# Patient Record
Sex: FEMALE | Race: WHITE | NOT HISPANIC OR LATINO | Employment: FULL TIME | URBAN - METROPOLITAN AREA
[De-identification: names, ages, dates, MRNs, and addresses within clinical notes are randomized per-mention and may not be internally consistent; named-entity substitution may affect disease eponyms.]

---

## 2020-02-10 ENCOUNTER — OFFICE VISIT (OUTPATIENT)
Dept: FAMILY MEDICINE CLINIC | Facility: CLINIC | Age: 20
End: 2020-02-10
Payer: COMMERCIAL

## 2020-02-10 ENCOUNTER — OFFICE VISIT (OUTPATIENT)
Dept: OBGYN CLINIC | Facility: CLINIC | Age: 20
End: 2020-02-10
Payer: COMMERCIAL

## 2020-02-10 VITALS
HEIGHT: 64 IN | OXYGEN SATURATION: 98 % | BODY MASS INDEX: 27.18 KG/M2 | SYSTOLIC BLOOD PRESSURE: 120 MMHG | DIASTOLIC BLOOD PRESSURE: 86 MMHG | HEART RATE: 79 BPM | TEMPERATURE: 99.5 F | WEIGHT: 159.2 LBS

## 2020-02-10 VITALS
RESPIRATION RATE: 14 BRPM | WEIGHT: 159.4 LBS | BODY MASS INDEX: 27.21 KG/M2 | DIASTOLIC BLOOD PRESSURE: 60 MMHG | SYSTOLIC BLOOD PRESSURE: 110 MMHG | HEIGHT: 64 IN

## 2020-02-10 DIAGNOSIS — Z11.4 SCREENING FOR HIV (HUMAN IMMUNODEFICIENCY VIRUS): Primary | ICD-10-CM

## 2020-02-10 DIAGNOSIS — Z11.3 SCREEN FOR STD (SEXUALLY TRANSMITTED DISEASE): ICD-10-CM

## 2020-02-10 DIAGNOSIS — Z01.419 ENCOUNTER FOR ANNUAL ROUTINE GYNECOLOGICAL EXAMINATION: Primary | ICD-10-CM

## 2020-02-10 DIAGNOSIS — F41.1 GENERALIZED ANXIETY DISORDER: ICD-10-CM

## 2020-02-10 PROCEDURE — 87591 N.GONORRHOEAE DNA AMP PROB: CPT | Performed by: NURSE PRACTITIONER

## 2020-02-10 PROCEDURE — 1036F TOBACCO NON-USER: CPT | Performed by: FAMILY MEDICINE

## 2020-02-10 PROCEDURE — 87491 CHLMYD TRACH DNA AMP PROBE: CPT | Performed by: NURSE PRACTITIONER

## 2020-02-10 PROCEDURE — 99203 OFFICE O/P NEW LOW 30 MIN: CPT | Performed by: FAMILY MEDICINE

## 2020-02-10 PROCEDURE — 99385 PREV VISIT NEW AGE 18-39: CPT | Performed by: NURSE PRACTITIONER

## 2020-02-10 PROCEDURE — 3008F BODY MASS INDEX DOCD: CPT | Performed by: FAMILY MEDICINE

## 2020-02-10 NOTE — PROGRESS NOTES
BMI Counseling: Body mass index is 27 33 kg/m²  The BMI is above normal  Nutrition recommendations include reducing portion sizes  Exercise recommendations include joining a gym and strength training exercises

## 2020-02-10 NOTE — PROGRESS NOTES
Diagnoses and all orders for this visit:    1  Encounter for annual routine gynecological examination  Safe sex practices and condom use encouraged  Healthy eating and nutrition, daily exercise discussed and SBE reinforced  Call with any issues and all questions and concerns addressed  2  Screen for STD (sexually transmitted disease)  -     Chlamydia/GC amplified DNA by PCR    All questions and concerns answered  Patient to call with any questions  Pleasant 20 y o  premenopausal female here for new patient annual exam  She reports  heavy menses  Reports regular cycles  Denies vaginal, urinary or breast issues, today  Denies pelvic pain  Declines hormonal birth control at this time  Sexually active without any concerns and pt requests STD testing excluding blood work  Denies F/C/N/V  History reviewed  No pertinent past medical history  History reviewed  No pertinent surgical history  History reviewed  No pertinent family history  Social History     Tobacco Use    Smoking status: Never Smoker    Smokeless tobacco: Never Used   Substance Use Topics    Alcohol use: Never     Frequency: Never    Drug use: Never     No current outpatient medications on file  Patient Active Problem List    Diagnosis Date Noted    Generalized anxiety disorder 02/10/2020    Screen for STD (sexually transmitted disease) 02/10/2020    Encounter for annual routine gynecological examination 02/10/2020       No Known Allergies    OB History   No data available     She just completed school to become a [employer]   Works on [employer]     Vitals:    02/10/20 1317   BP: 110/60   BP Location: Right arm   Patient Position: Sitting   Cuff Size: Standard   Resp: 14   Weight: 72 3 kg (159 lb 6 4 oz)   Height: 5' 4" (1 626 m)     Body mass index is 27 36 kg/m²  Review of Systems   Constitutional: Negative for chills, fatigue, fever and unexpected weight change  Respiratory: Negative for shortness of breath  Gastrointestinal: Negative for anal bleeding, blood in stool, constipation and diarrhea  Genitourinary: Negative for difficulty urinating, dysuria and hematuria  OBGyn Exam    Physical Exam   Constitutional: She appears well-developed and well-nourished  No distress  Head: Normocephalic  Neck: Normal range of motion  Neck supple  Pulmonary: Effort normal   Breasts: bilateral without masses, skin changes or nipple discharge  Bilaterally soft and warm to touch  No areas of erythema or pain  Abdominal: Soft  Non-tender  Pelvic exam was performed with patient supine  No labial fusion  There is no rash, tenderness, lesion or injury on the right labia  There is no rash, tenderness, lesion or injury on the left labia  Uterus is not deviated, not enlarged, not fixed and not tender  Cervix exhibits no motion tenderness, no discharge and no friability  Right adnexum displays no mass, no tenderness and no fullness  Left adnexum displays no mass, no tenderness and no fullness  No erythema or tenderness in the vagina  No foreign body in the vagina  No signs of injury around the vagina  Small amount of white vaginal discharge found  STD culture collected  Lymphadenopathy:        Right: No inguinal adenopathy present          Left: No inguinal adenopathy present

## 2020-02-10 NOTE — ASSESSMENT & PLAN NOTE
Patient has mild anxiety generalized in nature at times has difficulty with sleep but otherwise is healthy  She notes that she loses concentration as sometimes or does not here with her climbed her speaking about or remember what they said and has to repeat this at times  She would rather be busy and loses concentration and begins the fissured if she is not busy at work  Overall she has had a mild history of slight attention deficit in high school and took cyber school and did well with that    She appears healthy overall at this time we discussed various options I do not recommend medication for this will talk about attention deficit informational projects and referral for counseling to discuss further

## 2020-02-10 NOTE — PATIENT INSTRUCTIONS
Attention Deficit Hyperactivity Disorder   WHAT YOU SHOULD KNOW:   Attention deficit hyperactivity disorder (ADHD) is a condition that affects behavior  People with ADHD can be overactive and have short attention spans  ADHD interferes with how you function in your day-to-day activities at work, school, or at home  ADHD may also cause you to have problems getting along with other people  The exact cause of ADHD is not known  CARE AGREEMENT:   You have the right to help plan your care  Learn about your health condition and how it may be treated  Discuss treatment options with your caregivers to decide what care you want to receive  You always have the right to refuse treatment  RISKS:   · Some medicines may cause you to have sleeping problems, headache, abdominal pain, or convulsions  Other side effects include loss of appetite, vomiting, irritability, and unusual changes in behavior  · If left untreated, your behavior may get worse and you may also develop other serious problems  These include alcohol or drug use, depression, and problems with your mood, friendships, and relationships  You may have a poor image of yourself  ADHD may affect your behavior at home, work, or school  With ADHD, you may have thoughts of harming yourself or others  WHILE YOU ARE HERE:   Informed consent  is a legal document that explains the tests, treatments, or procedures that you may need  Informed consent means you understand what will be done and can make decisions about what you want  You give your permission when you sign the consent form  You can have someone sign this form for you if you are not able to sign it  You have the right to understand your medical care in words you know  Before you sign the consent form, understand the risks and benefits of what will be done  Make sure all your questions are answered     Psychiatric assessment:  Caregivers will ask if you have a history of psychological trauma, such as physical, sexual, or mental abuse  They will ask if you were given the care that you needed  Caregivers will ask you if you have been a victim of a crime or natural disaster, or if you have a serious injury or disease  They will ask you if you have seen other people being harmed, such as in combat  You will be asked if you drink alcohol or use drugs at present or in the past  Caregivers will ask you if you want to hurt or kill yourself or others  How you answer these questions can help caregivers decide on treatment  To help during treatment, caregivers will ask you about such things as how you feel about it and your hobbies and goals  Caregivers will also ask you about the people in your life who support you  An IV  is a small tube placed in your vein that is used to give you medicine or liquids  Medicines:   · Stimulants: This medicine helps you pay attention, concentrate better, and manage your energy  · Antidepressants: This medicine helps decrease or prevent the symptoms of depression  It can also be used to treat other behavior problems  · Blood pressure medicines: This medicine is usually used to control high blood pressure  It may also be used to help decrease motor tics (uncontrolled movements)  Blood pressure medicine may help you feel calmer, more focused, and less irritable  · Anticonvulsant medicine: This medicine is given to control seizures  Take this medicine exactly as directed  Tests:   · Telemetry  is continuous monitoring of your heart rhythm  Sticky pads placed on your skin connect to an EKG machine that records your heart rhythm  · Blood and urine tests: These tests may be done to find the cause of your ADHD or rule out other health conditions  · Neurologic exam:  This is also called neuro signs, neuro checks, or neuro status  A neurologic exam can show caregivers how well your brain works after an injury or illness   Caregivers will check how your pupils (black dots in the center of each eye) react to light  They may check your memory and how easily you wake up  Your hand grasp and balance may also be tested  Treatment:   · Behavior therapy:  A therapist will help you learn how to control your actions and improve your behavior  This therapy may help you change your behavior by looking at the results of your actions  · Psychotherapy: This is also called talk therapy  You may have one-on-one visit with a therapist or with others in a group setting  © 2014 [address] is for End User's use only and may not be sold, redistributed or otherwise used for commercial purposes  All illustrations and images included in CareNotes® are the copyrighted property of A D A M [employer] Inc  or [employer]  The above information is an  only  It is not intended as medical advice for individual conditions or treatments  Talk to your doctor, nurse or pharmacist before following any medical regimen to see if it is safe and effective for you

## 2020-02-10 NOTE — PATIENT INSTRUCTIONS
Pap Smear   GENERAL INFORMATION:   What is a Pap smear? A Pap smear, or Pap test, is a procedure to check your cervix for abnormal cells  The cervix is the narrow opening at the bottom of your uterus  The cervix meets the top part of the vagina  How do I prepare for a Pap smear? The best time to schedule the test is right after your period stops  Do not have a Pap smear during your monthly period  Do not have intercourse or put anything in your vagina for 24 hours before your test    What will happen during a Pap smear? · You will lie on your back and place your feet on footrests called stirrups  Your caregiver will gently insert a device called a speculum into your vagina  The speculum is used to spread the walls of your vagina so he can see your cervix  He will use a thin brush or cotton swab to collect cells from the inside of your cervix  · Your caregiver will also collect cells from the surface of your cervix with a plastic or wooden tool called a spatula  He may also gently scrape the upper part of your vagina for a sample  The samples are placed in a container with liquid or on a glass slide  They are sent to a lab and examined for abnormal cells  How often do I need a Pap smear? Pap smears are usually done every 1 to 3 years  You may need a Pap smear more often if you have any of the following:  · Positive test result for the human papillomavirus (HPV)    · Cervical intraepithelial neoplasm or cervical cancer    · HIV    · A weak immune system    · Exposure to diethylstilbestrol (JASON) medicine when your mother was pregnant with you  CARE AGREEMENT:   You have the right to help plan your care  Learn about your health condition and how it may be treated  Discuss treatment options with your caregivers to decide what care you want to receive  You always have the right to refuse treatment  The above information is an  only   It is not intended as medical advice for individual conditions or treatments  Talk to your doctor, nurse or pharmacist before following any medical regimen to see if it is safe and effective for you  © 2014 [address] is for End User's use only and may not be sold, redistributed or otherwise used for commercial purposes  All illustrations and images included in CareNotes® are the copyrighted property of A [employer] A M [employer] Inc  or [employer] Braswell

## 2020-02-10 NOTE — PROGRESS NOTES
Assessment/Plan:       Problem List Items Addressed This Visit        Other    Generalized anxiety disorder     Patient has mild anxiety generalized in nature at times has difficulty with sleep but otherwise is healthy  She notes that she loses concentration as sometimes or does not here with her climbed her speaking about or remember what they said and has to repeat this at times  She would rather be busy and loses concentration and begins the fissured if she is not busy at work  Overall she has had a mild history of slight attention deficit in high school and took cyber school and did well with that  She appears healthy overall at this time we discussed various options I do not recommend medication for this will talk about attention deficit informational projects and referral for counseling to discuss further         Relevant Orders    Ambulatory referral to [facility]      Other Visit Diagnoses     Screening for HIV (human immunodeficiency virus)    -  Primary    Relevant Orders    HIV 1/2 AG-AB combo            Subjective:      Patient ID: Linda Moses is a 20 y o  female  Patient presents today for attention issues she has has a new job now as a  she finished in completed her schooling for this but is not busy yet and at times has difficulty with concentration she noticed that back in school and she started cyber school and did better with that at that time    If her issues start affecting her ability to function in her daily living affecting her sleep patterns and her appetite her she gets sick from this physically we can address this further with medication but she came in today to discuss this and approach the treatment plan      The following portions of the patient's history were reviewed and updated as appropriate: allergies, current medications, past family history, past medical history, past social history, past surgical history and problem list     Review of Systems Constitutional: Negative for chills, fatigue and fever  HENT: Negative for congestion, nosebleeds, rhinorrhea, sinus pressure and sore throat  Eyes: Negative for discharge and redness  Respiratory: Negative for cough and shortness of breath  Cardiovascular: Negative for chest pain, palpitations and leg swelling  Gastrointestinal: Negative for abdominal pain, blood in stool and nausea  Endocrine: Negative for cold intolerance, heat intolerance and polyuria  Genitourinary: Negative for dysuria and frequency  Musculoskeletal: Negative for arthralgias, back pain and myalgias  Skin: Negative for rash  Neurological: Negative for dizziness, weakness and headaches  Hematological: Negative for adenopathy  Psychiatric/Behavioral: Negative for behavioral problems and sleep disturbance  The patient is nervous/anxious  Objective:      /86   Pulse 79   Temp 99 5 °F (37 5 °C)   Ht 5' 4" (1 626 m)   Wt 72 2 kg (159 lb 3 2 oz)   SpO2 98%   BMI 27 33 kg/m²        Physical Exam   Constitutional: She is oriented to person, place, and time  She appears well-developed and well-nourished  No distress  HENT:   Head: Normocephalic and atraumatic  Right Ear: External ear normal    Left Ear: External ear normal    Nose: Nose normal    Mouth/Throat: Oropharynx is clear and moist  No oropharyngeal exudate  Eyes: Pupils are equal, round, and reactive to light  Conjunctivae and EOM are normal  Right eye exhibits no discharge  Left eye exhibits no discharge  No scleral icterus  Neck: Normal range of motion  No JVD present  No thyromegaly present  Cardiovascular: Normal rate, regular rhythm and normal heart sounds  No murmur heard  Pulmonary/Chest: Effort normal  She has no wheezes  She has no rales  She exhibits no tenderness  Abdominal: Soft  Bowel sounds are normal  She exhibits no distension and no mass  There is no tenderness  Musculoskeletal: Normal range of motion   She exhibits no edema, tenderness or deformity  Lymphadenopathy:     She has no cervical adenopathy  Neurological: She is alert and oriented to person, place, and time  She has normal reflexes  She displays normal reflexes  No cranial nerve deficit  Coordination normal    Skin: Skin is warm and dry  No rash noted  Psychiatric: She has a normal mood and affect  Her behavior is normal  Judgment and thought content normal    Nursing note and vitals reviewed  Data:    Laboratory Results: I have personally reviewed the pertinent laboratory results/reports   Radiology/Other Diagnostic Testing Results: I have personally reviewed pertinent reports         No results found for: WBC, HGB, HCT, MCV, PLT  No results found for: NA, K, CL, CO2, ANIONGAP, BUN, CREATININE, GLUCOSE, GLUF, CALCIUM, CORRECTEDCA, AST, ALT, ALKPHOS, PROT, BILITOT, EGFR  No results found for: CHOLESTEROL  No results found for: HDL  No results found for: LDLCALC  No results found for: TRIG  No results found for: CHOLHDL  No results found for: [id number], TSH  No results found for: HGBA1C  No results found for: PSA    Land O'Naima, DO

## 2020-02-12 LAB
C TRACH DNA SPEC QL NAA+PROBE: NEGATIVE
N GONORRHOEA DNA SPEC QL NAA+PROBE: NEGATIVE

## 2020-07-16 ENCOUNTER — OFFICE VISIT (OUTPATIENT)
Dept: FAMILY MEDICINE CLINIC | Facility: CLINIC | Age: 20
End: 2020-07-16
Payer: COMMERCIAL

## 2020-07-16 VITALS
WEIGHT: 162.8 LBS | HEART RATE: 78 BPM | BODY MASS INDEX: 27.79 KG/M2 | SYSTOLIC BLOOD PRESSURE: 120 MMHG | HEIGHT: 64 IN | TEMPERATURE: 97.8 F | OXYGEN SATURATION: 99 % | DIASTOLIC BLOOD PRESSURE: 78 MMHG

## 2020-07-16 DIAGNOSIS — B07.8 COMMON WART: Primary | ICD-10-CM

## 2020-07-16 PROCEDURE — 1036F TOBACCO NON-USER: CPT | Performed by: FAMILY MEDICINE

## 2020-07-16 PROCEDURE — 3008F BODY MASS INDEX DOCD: CPT | Performed by: FAMILY MEDICINE

## 2020-07-16 PROCEDURE — 17110 DESTRUCTION B9 LES UP TO 14: CPT | Performed by: FAMILY MEDICINE

## 2020-07-16 PROCEDURE — 99213 OFFICE O/P EST LOW 20 MIN: CPT | Performed by: FAMILY MEDICINE

## 2020-07-16 NOTE — PATIENT INSTRUCTIONS
Common Wart   WHAT YOU NEED TO KNOW:   A common wart is a thick, rough, skin growth caused by human papillomavirus virus (HPV)  HPV is a germ that spreads by skin-to-skin contact or contact with contaminated surfaces  Common warts are benign (not cancer)  DISCHARGE INSTRUCTIONS:   Contact your healthcare provider or dermatologist if:   · Your wart returns or does not go away after treatment  · Your wart grows larger, or begins to spread or cluster  · You have a wart on your face, genitals, or rectum  · Your wart bleeds, becomes painful, or drains pus  · You have questions about your condition or care  Medicines:   · Salicylic acid  helps dry and remove the wart  It is available without a prescription  Ask your healthcare provider where you can purchase it  Before you apply salicylic acid, soak the wart in warm water for 20 minutes  Keep your wart damp  Apply a small amount of salicylic acid directly to your wart  Do not apply salicylic acid to healthy skin  Cover the wart as directed  It is best to do this at bedtime  When you wake, use a pumice stone (a rough stone) or nail file to gently remove dead skin  Repeat as directed  · Take your medicine as directed  Contact your healthcare provider if you think your medicine is not helping or if you have side effects  Tell him or her if you are allergic to any medicine  Keep a list of the medicines, vitamins, and herbs you take  Include the amounts, and when and why you take them  Bring the list or the pill bottles to follow-up visits  Carry your medicine list with you in case of an emergency  Apply duct tape to your wart as directed: Your healthcare provider may tell you to apply duct tape to your wart  Duct tape helps dry and remove the wart  You may be directed to leave the duct tape on for 6 days  On day 7, take the tape off and soak the wart in warm water for 5 minutes  Gently scrape the wart with a pumice stone or nail file   Then apply a new piece of duct tape and follow the same steps until the wart is gone  Follow up with your healthcare provider as directed:  Write down your questions so you remember to ask them during your visits  © 2017 [address] Information is for End User's use only and may not be sold, redistributed or otherwise used for commercial purposes  All illustrations and images included in CareNotes® are the copyrighted property of [employer]  or [employer]  The above information is an  only  It is not intended as medical advice for individual conditions or treatments  Talk to your doctor, nurse or pharmacist before following any medical regimen to see if it is safe and effective for you

## 2020-07-16 NOTE — PROGRESS NOTES
Assessment/Plan:       Problem List Items Addressed This Visit        Other    Common wart - Primary     Presents for a 0 75 cm wart on the index finger of her right hand at the lateral aspect of the nail bed  Cryosurgery done at this time -re-evaluate in 6 weeks                 Subjective:      Patient ID: Lei Fothergill is a 20 y o  female  Patient presents for a wart on her index finger has been present for about 2 months did not come off from home treatment and needs removal today      The following portions of the patient's history were reviewed and updated as appropriate: allergies, current medications, past family history, past medical history, past social history, past surgical history and problem list     Review of Systems   Constitutional: Negative for chills, fatigue and fever  HENT: Negative for congestion, nosebleeds, rhinorrhea, sinus pressure and sore throat  Eyes: Negative for discharge and redness  Respiratory: Negative for cough and shortness of breath  Cardiovascular: Negative for chest pain, palpitations and leg swelling  Gastrointestinal: Negative for abdominal pain, blood in stool and nausea  Endocrine: Negative for cold intolerance, heat intolerance and polyuria  Genitourinary: Negative for dysuria and frequency  Musculoskeletal: Negative for arthralgias, back pain and myalgias  Skin: Negative for rash  Neurological: Negative for dizziness, weakness and headaches  Hematological: Negative for adenopathy  Psychiatric/Behavioral: Negative for behavioral problems and sleep disturbance  The patient is not nervous/anxious  Objective:      /78 (BP Location: Left arm, Patient Position: Sitting)   Pulse 78   Temp 97 8 °F (36 6 °C)   Ht 5' 4" (1 626 m)   Wt 73 8 kg (162 lb 12 8 oz)   SpO2 99%   BMI 27 94 kg/m²          Physical Exam   Constitutional: She is oriented to person, place, and time  She appears well-developed and well-nourished  No distress  HENT:   Head: Normocephalic and atraumatic  Right Ear: External ear normal    Left Ear: External ear normal    Nose: Nose normal    Mouth/Throat: Oropharynx is clear and moist  No oropharyngeal exudate  Eyes: Pupils are equal, round, and reactive to light  Conjunctivae and EOM are normal  Right eye exhibits no discharge  Left eye exhibits no discharge  No scleral icterus  Neck: Normal range of motion  No JVD present  No thyromegaly present  Cardiovascular: Normal rate, regular rhythm and normal heart sounds  No murmur heard  Pulmonary/Chest: Effort normal  She has no wheezes  She has no rales  She exhibits no tenderness  Abdominal: Soft  Bowel sounds are normal  She exhibits no distension and no mass  There is no tenderness  Musculoskeletal: Normal range of motion  She exhibits no edema, tenderness or deformity  Lymphadenopathy:     She has no cervical adenopathy  Neurological: She is alert and oriented to person, place, and time  She has normal reflexes  She displays normal reflexes  No cranial nerve deficit  Coordination normal    Skin: Skin is warm and dry  No rash noted  Psychiatric: She has a normal mood and affect  Her behavior is normal  Judgment and thought content normal    Nursing note and vitals reviewed      Lesion Destruction  Date/Time: 7/16/2020 11:28 AM  Performed by: Jean Shepherd DO  Authorized by: Jean Shepherd DO     Procedure Details - Lesion Destruction:     Number of Lesions:  1  Lesion 1:     Body area:  Upper extremity    Upper extremity location:  R index finger    Malignancy: benign lesion      Destruction method: cryotherapy          Data:    Laboratory Results:   Radiology/Other Diagnostic Testing Results:    No results found for: WBC, HGB, HCT, MCV, PLT  No results found for: NA, K, CL, CO2, ANIONGAP, BUN, CREATININE, GLUCOSE, GLUF, CALCIUM, CORRECTEDCA, AST, ALT, ALKPHOS, PROT, BILITOT, EGFR  No results found for: CHOLESTEROL  No results found for: HDL  No results found for: LDLCALC  No results found for: TRIG  No results found for: CHOLHDL  No results found for: [id number], TSH  No results found for: HGBA1C  No results found for: PSA    Consuelo Cornelius, DO

## 2020-07-16 NOTE — ASSESSMENT & PLAN NOTE
Presents for a 0 75 cm wart on the index finger of her right hand at the lateral aspect of the nail bed    Cryosurgery done at this time -re-evaluate in 6 weeks

## 2021-12-27 ENCOUNTER — CLINICAL SUPPORT (OUTPATIENT)
Dept: FAMILY MEDICINE CLINIC | Facility: CLINIC | Age: 21
End: 2021-12-27

## 2021-12-27 DIAGNOSIS — R05.9 COUGH: Primary | ICD-10-CM

## 2021-12-27 PROCEDURE — 87636 SARSCOV2 & INF A&B AMP PRB: CPT | Performed by: FAMILY MEDICINE

## 2021-12-30 LAB
FLUAV RNA RESP QL NAA+PROBE: NEGATIVE
FLUBV RNA RESP QL NAA+PROBE: NEGATIVE
SARS-COV-2 RNA RESP QL NAA+PROBE: POSITIVE

## 2023-06-23 ENCOUNTER — OFFICE VISIT (OUTPATIENT)
Dept: FAMILY MEDICINE CLINIC | Facility: CLINIC | Age: 23
End: 2023-06-23
Payer: COMMERCIAL

## 2023-06-23 VITALS
TEMPERATURE: 97.5 F | HEIGHT: 64 IN | HEART RATE: 72 BPM | BODY MASS INDEX: 29.53 KG/M2 | OXYGEN SATURATION: 96 % | WEIGHT: 173 LBS | RESPIRATION RATE: 18 BRPM | DIASTOLIC BLOOD PRESSURE: 72 MMHG | SYSTOLIC BLOOD PRESSURE: 110 MMHG

## 2023-06-23 DIAGNOSIS — J34.89 DRY NARES: Primary | ICD-10-CM

## 2023-06-23 PROCEDURE — 99213 OFFICE O/P EST LOW 20 MIN: CPT | Performed by: FAMILY MEDICINE

## 2023-06-23 RX ORDER — AZITHROMYCIN 250 MG/1
TABLET, FILM COATED ORAL
Qty: 6 TABLET | Refills: 0 | Status: SHIPPED | OUTPATIENT
Start: 2023-06-23 | End: 2023-06-28

## 2023-06-23 NOTE — ASSESSMENT & PLAN NOTE
inflammed nose ring area right nares-no drainage now    Patient will apply triple antibiotic ointment to this area and I will provide her with a short course of antibiotics if it does not improve or if it worsens she will contact our office next week

## 2023-06-23 NOTE — PROGRESS NOTES
"Assessment/Plan:       Problem List Items Addressed This Visit        Other    Dry nares - Primary     inflammed nose ring area right nares-no drainage now  Patient will apply triple antibiotic ointment to this area and I will provide her with a short course of antibiotics if it does not improve or if it worsens she will contact our office next week         Relevant Medications    azithromycin (ZITHROMAX) 250 mg tablet         Subjective:      Patient ID: David Gotti is a 20 y o  female  Inflamed nose ring insertion area right nares started to improve after she was at the beach and swimming in the ocean      The following portions of the patient's history were reviewed and updated as appropriate: allergies, current medications, past family history, past medical history, past social history, past surgical history and problem list     Review of Systems   Constitutional: Negative for chills, fatigue and fever  HENT: Negative for congestion, nosebleeds, rhinorrhea, sinus pressure and sore throat  Eyes: Negative for discharge and redness  Respiratory: Negative for cough and shortness of breath  Cardiovascular: Negative for chest pain, palpitations and leg swelling  Gastrointestinal: Negative for abdominal pain, blood in stool and nausea  Endocrine: Negative for cold intolerance, heat intolerance and polyuria  Genitourinary: Negative for dysuria and frequency  Musculoskeletal: Negative for arthralgias, back pain and myalgias  Skin: Negative for rash  Neurological: Negative for dizziness, weakness and headaches  Hematological: Negative for adenopathy  Psychiatric/Behavioral: Negative for behavioral problems and sleep disturbance  The patient is not nervous/anxious            Objective:      /72 (BP Location: Left arm, Patient Position: Sitting, Cuff Size: Standard)   Pulse 72   Temp 97 5 °F (36 4 °C) (Tympanic)   Resp 18   Ht 5' 4\" (1 626 m)   Wt 78 5 kg (173 lb)   SpO2 96%   BMI " "29 70 kg/m²        Physical Exam  Vitals and nursing note reviewed  Constitutional:       General: She is not in acute distress  Appearance: She is well-developed  HENT:      Head: Normocephalic and atraumatic  Right Ear: External ear normal       Left Ear: External ear normal       Nose: Nose normal       Mouth/Throat:      Pharynx: No oropharyngeal exudate  Eyes:      General: No scleral icterus  Right eye: No discharge  Left eye: No discharge  Conjunctiva/sclera: Conjunctivae normal       Pupils: Pupils are equal, round, and reactive to light  Neck:      Thyroid: No thyromegaly  Vascular: No JVD  Cardiovascular:      Rate and Rhythm: Normal rate and regular rhythm  Heart sounds: Normal heart sounds  No murmur heard  Pulmonary:      Effort: Pulmonary effort is normal       Breath sounds: No wheezing or rales  Chest:      Chest wall: No tenderness  Abdominal:      General: Bowel sounds are normal  There is no distension  Palpations: Abdomen is soft  There is no mass  Tenderness: There is no abdominal tenderness  Musculoskeletal:         General: No tenderness or deformity  Normal range of motion  Cervical back: Normal range of motion  Lymphadenopathy:      Cervical: No cervical adenopathy  Skin:     General: Skin is warm and dry  Findings: No rash  Comments: Mild inflammation around nose ring right nares   Neurological:      Mental Status: She is alert and oriented to person, place, and time  Cranial Nerves: No cranial nerve deficit  Coordination: Coordination normal       Deep Tendon Reflexes: Reflexes are normal and symmetric  Reflexes normal    Psychiatric:         Behavior: Behavior normal          Thought Content:  Thought content normal          Judgment: Judgment normal           Data:    Laboratory Results:   Radiology/Other Diagnostic Testing Results:      No results found for: \"WBC\", \"HGB\", \"HCT\", \"MCV\", " "\"PLT\"  No results found for: \"NA\", \"K\", \"CL\", \"CO2\", \"ANIONGAP\", \"BUN\", \"CREATININE\", \"GLUCOSE\", \"GLUF\", \"CALCIUM\", \"CORRECTEDCA\", \"AST\", \"ALT\", \"ALKPHOS\", \"PROT\", \"BILITOT\", \"EGFR\"  No results found for: \"CHOLESTEROL\"  No results found for: \"HDL\"  No results found for: \"LDLCALC\"  No results found for: \"TRIG\"  No results found for: \"CHOLHDL\"  No results found for: \"[id number]\", \"TSH\"  No results found for: \"HGBA1C\"  No results found for: \"PSA\"    [place], DO      "

## 2023-07-03 ENCOUNTER — OFFICE VISIT (OUTPATIENT)
Dept: FAMILY MEDICINE CLINIC | Facility: CLINIC | Age: 23
End: 2023-07-03
Payer: COMMERCIAL

## 2023-07-03 VITALS
HEIGHT: 64 IN | WEIGHT: 176.8 LBS | BODY MASS INDEX: 30.19 KG/M2 | OXYGEN SATURATION: 99 % | SYSTOLIC BLOOD PRESSURE: 122 MMHG | TEMPERATURE: 98.1 F | HEART RATE: 63 BPM | DIASTOLIC BLOOD PRESSURE: 80 MMHG | RESPIRATION RATE: 18 BRPM

## 2023-07-03 DIAGNOSIS — T14.8XXA BLOOD BLISTER: ICD-10-CM

## 2023-07-03 DIAGNOSIS — Z78.9 BODY PIERCING: Primary | ICD-10-CM

## 2023-07-03 PROCEDURE — 99213 OFFICE O/P EST LOW 20 MIN: CPT | Performed by: NURSE PRACTITIONER

## 2023-07-03 RX ORDER — CLINDAMYCIN HYDROCHLORIDE 300 MG/1
300 CAPSULE ORAL 4 TIMES DAILY
Qty: 28 CAPSULE | Refills: 0 | Status: SHIPPED | OUTPATIENT
Start: 2023-07-03 | End: 2023-07-10

## 2023-07-03 NOTE — ASSESSMENT & PLAN NOTE
Piercing to right nares. Erythema surrounding area with blood blister intact. We will start on clindamycin and she will use triple antibiotic ointment with Q-tip. Mild pressure applied to blood blister with drainage.   Follow-up on Friday

## 2023-07-03 NOTE — PROGRESS NOTES
OFFICE VISIT  Jacquie Alatorre 20 y.o. female MRN: [id number]          Assessment / Plan:  Problem List Items Addressed This Visit        Other    Body piercing - Primary     Piercing to right nares. Erythema surrounding area with blood blister intact. We will start on clindamycin and she will use triple antibiotic ointment with Q-tip. Mild pressure applied to blood blister with drainage. Follow-up on Friday         Relevant Medications    clindamycin (CLEOCIN) 300 MG capsule   Other Visit Diagnoses     Blood blister                Reason For Visit / Chief Complaint  Chief Complaint   Patient presents with   • nose ring     Infection , black saw Dr last week and got a Z- pack         HPI:  Jacquie Alatorre is a 20 y.o. female who presents today for follow-up. Patient was seen last week for nose piercing questionable infection. She was started on azithromycin. She reports area remains red with black bump    Historical Information   History reviewed. No pertinent past medical history. History reviewed. No pertinent surgical history. Social History   Social History     Substance and Sexual Activity   Alcohol Use Never     Social History     Substance and Sexual Activity   Drug Use Never     Social History     Tobacco Use   Smoking Status Never   Smokeless Tobacco Never     History reviewed. No pertinent family history. Meds/Allergies   No Known Allergies    Meds:    Current Outpatient Medications:   •  clindamycin (CLEOCIN) 300 MG capsule, Take 1 capsule (300 mg total) by mouth 4 (four) times a day for 7 days, Disp: 28 capsule, Rfl: 0      REVIEW OF SYSTEMS  Review of Systems   Constitutional: Negative for activity change, chills, fatigue and fever. HENT: Negative for congestion, ear discharge, ear pain, sinus pressure, sinus pain, sore throat, tinnitus and trouble swallowing. Eyes: Negative for photophobia, pain, discharge, itching and visual disturbance.    Respiratory: Negative for cough, chest tightness, shortness of breath and wheezing. Cardiovascular: Negative for chest pain and leg swelling. Gastrointestinal: Negative for abdominal distention, abdominal pain, constipation, diarrhea, nausea and vomiting. Endocrine: Negative for polydipsia, polyphagia and polyuria. Genitourinary: Negative for dysuria and frequency. Musculoskeletal: Negative for arthralgias, myalgias, neck pain and neck stiffness. Skin: Positive for color change. Neurological: Negative for dizziness, syncope, weakness, numbness and headaches. Hematological: Does not bruise/bleed easily. Psychiatric/Behavioral: Negative for behavioral problems, confusion, self-injury, sleep disturbance and suicidal ideas. The patient is not nervous/anxious. Current Vitals:   Blood Pressure: 122/80 (07/03/23 1405)  Pulse: 63 (07/03/23 1405)  Temperature: 98.1 °F (36.7 °C) (07/03/23 1405)  Temp Source: Tympanic (07/03/23 1405)  Respirations: 18 (07/03/23 1405)  Height: 5' 4" (162.6 cm) (07/03/23 1405)  Weight - Scale: 80.2 kg (176 lb 12.8 oz) (07/03/23 1405)  SpO2: 99 % (07/03/23 1405)  [unfilled]    PHYSICAL EXAMS:  Physical Exam  Vitals and nursing note reviewed. Constitutional:       Appearance: Normal appearance. She is well-developed. HENT:      Head: Normocephalic and atraumatic. Right Ear: Tympanic membrane normal.      Left Ear: Tympanic membrane normal.   Cardiovascular:      Rate and Rhythm: Normal rate and regular rhythm. Pulses: Normal pulses. Heart sounds: Normal heart sounds. Pulmonary:      Effort: Pulmonary effort is normal.      Breath sounds: Normal breath sounds. No wheezing or rhonchi. Abdominal:      General: There is no distension. Tenderness: There is no abdominal tenderness. Musculoskeletal:         General: No swelling, tenderness, deformity or signs of injury. Right lower leg: No edema. Left lower leg: No edema. Skin:     Findings: Erythema present.  No bruising, lesion or rash. Comments: Blood blister to nose   Neurological:      General: No focal deficit present. Mental Status: She is alert and oriented to person, place, and time. Psychiatric:         Mood and Affect: Mood normal.         Behavior: Behavior normal.         Thought Content: Thought content normal.         Judgment: Judgment normal.             Lab, imaging and other studies: I have personally reviewed pertinent reports. Mira Mckenzie

## 2023-07-07 ENCOUNTER — OFFICE VISIT (OUTPATIENT)
Dept: FAMILY MEDICINE CLINIC | Facility: CLINIC | Age: 23
End: 2023-07-07
Payer: COMMERCIAL

## 2023-07-07 VITALS
OXYGEN SATURATION: 98 % | TEMPERATURE: 98.6 F | SYSTOLIC BLOOD PRESSURE: 120 MMHG | HEART RATE: 74 BPM | DIASTOLIC BLOOD PRESSURE: 80 MMHG | BODY MASS INDEX: 30.15 KG/M2 | RESPIRATION RATE: 18 BRPM | HEIGHT: 64 IN | WEIGHT: 176.6 LBS

## 2023-07-07 DIAGNOSIS — Z78.9 BODY PIERCING: Primary | ICD-10-CM

## 2023-07-07 DIAGNOSIS — L08.9 INFECTED PIERCED NOSE: ICD-10-CM

## 2023-07-07 DIAGNOSIS — S01.23XA INFECTED PIERCED NOSE: ICD-10-CM

## 2023-07-07 PROCEDURE — 99213 OFFICE O/P EST LOW 20 MIN: CPT | Performed by: NURSE PRACTITIONER

## 2023-07-07 NOTE — ASSESSMENT & PLAN NOTE
Here for recheck today, no worsening redness or swelling to right nares, nose ring. We will continue with clindamycin. Light lavage for drainage completed.   Will return Monday if needing I&D with primary PCP

## 2023-07-07 NOTE — PROGRESS NOTES
OFFICE VISIT  Andrade Sommers 20 y.o. female MRN: [id number]          Assessment / Plan:  Problem List Items Addressed This Visit        Other    Body piercing - Primary    Infected pierced nose     Here for recheck today, no worsening redness or swelling to right nares, nose ring. We will continue with clindamycin. Light lavage for drainage completed. Will return Monday if needing I&D with primary PCP              Reason For Visit / Chief Complaint  Chief Complaint   Patient presents with   • Nose Problem     Nose ring infected. HPI:  Andrade Sommers is a 20 y.o. female who presents today for recheck. Historical Information   No past medical history on file. History reviewed. No pertinent surgical history. Social History   Social History     Substance and Sexual Activity   Alcohol Use Never     Social History     Substance and Sexual Activity   Drug Use Never     Social History     Tobacco Use   Smoking Status Never   Smokeless Tobacco Never     History reviewed. No pertinent family history. Meds/Allergies   No Known Allergies    Meds:    Current Outpatient Medications:   •  clindamycin (CLEOCIN) 300 MG capsule, Take 1 capsule (300 mg total) by mouth 4 (four) times a day for 7 days, Disp: 28 capsule, Rfl: 0      REVIEW OF SYSTEMS  Review of Systems   Constitutional: Negative for activity change, chills, fatigue and fever. HENT: Negative for congestion, ear discharge, ear pain, sinus pressure, sinus pain, sore throat, tinnitus and trouble swallowing. Eyes: Negative for photophobia, pain, discharge, itching and visual disturbance. Respiratory: Negative for cough, chest tightness, shortness of breath and wheezing. Cardiovascular: Negative for chest pain and leg swelling. Gastrointestinal: Negative for abdominal distention, abdominal pain, constipation, diarrhea, nausea and vomiting. Endocrine: Negative for polydipsia, polyphagia and polyuria.    Genitourinary: Negative for dysuria and frequency. Musculoskeletal: Negative for arthralgias, myalgias, neck pain and neck stiffness. Skin: Positive for color change. Neurological: Negative for dizziness, syncope, weakness, numbness and headaches. Hematological: Does not bruise/bleed easily. Psychiatric/Behavioral: Negative for behavioral problems, confusion, self-injury, sleep disturbance and suicidal ideas. The patient is not nervous/anxious. Current Vitals:   Blood Pressure: 120/80 (07/07/23 0937)  Pulse: 74 (07/07/23 0937)  Temperature: 98.6 °F (37 °C) (07/07/23 0937)  Temp Source: Tympanic (07/07/23 0268)  Respirations: 18 (07/07/23 0937)  Height: 5' 4" (162.6 cm) (07/07/23 7098)  Weight - Scale: 80.1 kg (176 lb 9.6 oz) (07/07/23 0937)  SpO2: 98 % (07/07/23 0937)  [unfilled]    PHYSICAL EXAMS:  Physical Exam  Vitals and nursing note reviewed. Constitutional:       Appearance: Normal appearance. She is well-developed. HENT:      Head: Normocephalic and atraumatic. Nose:        Comments: Small papule above nose ring insertion  Cardiovascular:      Rate and Rhythm: Normal rate and regular rhythm. Pulses: Normal pulses. Heart sounds: Normal heart sounds. Pulmonary:      Effort: Pulmonary effort is normal.      Breath sounds: Normal breath sounds. No wheezing or rhonchi. Abdominal:      General: There is no distension. Tenderness: There is no abdominal tenderness. Musculoskeletal:         General: No swelling, tenderness, deformity or signs of injury. Cervical back: Normal range of motion. Right lower leg: No edema. Left lower leg: No edema. Skin:     General: Skin is warm. Findings: No bruising, erythema, lesion or rash. Neurological:      General: No focal deficit present. Mental Status: She is alert and oriented to person, place, and time. Psychiatric:         Mood and Affect: Mood normal.         Behavior: Behavior normal.         Thought Content:  Thought content normal.         Judgment: Judgment normal.             Lab, imaging and other studies: I have personally reviewed pertinent reports. Jelena Ignacio

## 2023-10-26 ENCOUNTER — VBI (OUTPATIENT)
Dept: ADMINISTRATIVE | Facility: OTHER | Age: 23
End: 2023-10-26

## 2024-02-21 PROBLEM — Z11.3 SCREEN FOR STD (SEXUALLY TRANSMITTED DISEASE): Status: RESOLVED | Noted: 2020-02-10 | Resolved: 2024-02-21

## 2024-02-21 PROBLEM — Z01.419 ENCOUNTER FOR ANNUAL ROUTINE GYNECOLOGICAL EXAMINATION: Status: RESOLVED | Noted: 2020-02-10 | Resolved: 2024-02-21

## 2024-05-29 ENCOUNTER — ULTRASOUND (OUTPATIENT)
Dept: OBGYN CLINIC | Facility: CLINIC | Age: 24
End: 2024-05-29
Payer: COMMERCIAL

## 2024-05-29 VITALS
DIASTOLIC BLOOD PRESSURE: 68 MMHG | WEIGHT: 180 LBS | HEIGHT: 64 IN | BODY MASS INDEX: 30.73 KG/M2 | SYSTOLIC BLOOD PRESSURE: 104 MMHG

## 2024-05-29 DIAGNOSIS — Z34.90 EARLY STAGE OF PREGNANCY: Primary | ICD-10-CM

## 2024-05-29 DIAGNOSIS — N91.2 AMENORRHEA: ICD-10-CM

## 2024-05-29 DIAGNOSIS — O21.9 NAUSEA AND VOMITING IN PREGNANCY: ICD-10-CM

## 2024-05-29 PROCEDURE — 99204 OFFICE O/P NEW MOD 45 MIN: CPT | Performed by: STUDENT IN AN ORGANIZED HEALTH CARE EDUCATION/TRAINING PROGRAM

## 2024-05-29 PROCEDURE — 76817 TRANSVAGINAL US OBSTETRIC: CPT | Performed by: STUDENT IN AN ORGANIZED HEALTH CARE EDUCATION/TRAINING PROGRAM

## 2024-05-29 RX ORDER — ONDANSETRON 4 MG/1
4 TABLET, ORALLY DISINTEGRATING ORAL EVERY 6 HOURS PRN
Qty: 30 TABLET | Refills: 0 | Status: SHIPPED | OUTPATIENT
Start: 2024-05-29

## 2024-05-29 NOTE — PROGRESS NOTES
Pt is here for early ultrasound   No concerns at this time    G1  LMP 3/29  Regular Cycles   8 weeks 5 days   KEN 1/3/25  [place] Pregnancy   No Vaginal Bleeding   Nausea and Vomiting  Blood Type N/A       Ultrasound Probe Disinfection    A transvaginal ultrasound was performed.   Prior to use, disinfection was performed with High Level Disinfection Process ([employer])  Probe serial number [id number]:  [id number] was used    HARJEET TORRES  05/29/24  1:49 PM

## 2024-05-29 NOTE — PROGRESS NOTES
"Ambulatory Visit  Name: Elda Flores      : 2000      MRN: [id number]  Encounter Provider: Charlee Nick MD  Encounter Date: 2024   Encounter department: [facility] [place]    Assessment & Plan   1. Early stage of pregnancy  2. Amenorrhea  -     AMB US OB < 14 weeks single or first gestation level 1  3. Nausea and vomiting in pregnancy  -     ondansetron (ZOFRAN-ODT) 4 mg disintegrating tablet; Take 1 tablet (4 mg total) by mouth every 6 (six) hours as needed for nausea or vomiting    Early pregnancy at 8 weeks 3 days with a calculated KEN of 2025 based on early ultrasound    - Genetic screening options reviewed. She is interested in NIPT, so MFM referral placed  - Prenatal care reviewed  - Pregnancy precautions reviewed    RTO for OB interview and PN-1 visit      History of Present Illness     Elda Flores is a 23 y.o. female who presents today for verification of pregnancy. History also given by her partner.  female, Patient's last menstrual period was 2024 (unsure of exact date). Menses are regular. This pregnancy was unplanned, but is welcomed.     Taking a prenatal vitamin intermittently because she is nauseated and having vomiting.     Review of Systems no vaginal bleeding      Objective     /68 (BP Location: Left arm, Patient Position: Sitting, Cuff Size: Large)   Ht 5' 4\" (1.626 m)   Wt 81.6 kg (180 lb)   LMP 2024 (Exact Date)   BMI 30.90 kg/m²     Physical Exam  Vitals and nursing note reviewed.   Constitutional:       General: She is not in acute distress.     Appearance: She is well-developed.   HENT:      Head: Normocephalic and atraumatic.   Cardiovascular:      Rate and Rhythm: Normal rate.   Pulmonary:      Effort: Pulmonary effort is normal. No respiratory distress.   Genitourinary:     General: Normal vulva.      Exam position: Lithotomy position.      Vagina: No bleeding.   Skin:     General: Skin is warm " and dry.      Capillary Refill: Capillary refill takes less than 2 seconds.   Neurological:      Mental Status: She is alert.   Psychiatric:         Mood and Affect: Mood normal.       Administrative Statements

## 2024-05-30 ENCOUNTER — TELEPHONE (OUTPATIENT)
Age: 24
End: 2024-05-30

## 2024-05-30 ENCOUNTER — TELEPHONE (OUTPATIENT)
Dept: PERINATAL CARE | Facility: OTHER | Age: 24
End: 2024-05-30

## 2024-05-30 NOTE — TELEPHONE ENCOUNTER
Called patient to schedule MFM appointment, based on referral issued to Maternal Fetal Medicine by OB office.    Left voicemail requesting patient to call back and schedule appointment, with office number for return call [phone].

## 2024-06-11 NOTE — PATIENT INSTRUCTIONS
Congratulations!! Please review our Pregnancy Essential Guide and [facility] L&D Virtual tour from our networks website.     [facility] Pregnancy Essentials Guide  [facility] ([email or web address])     [facility] ([facility]

## 2024-06-14 ENCOUNTER — INITIAL PRENATAL (OUTPATIENT)
Dept: OBGYN CLINIC | Facility: CLINIC | Age: 24
End: 2024-06-14

## 2024-06-14 VITALS — BODY MASS INDEX: 30.73 KG/M2 | HEIGHT: 64 IN | WEIGHT: 180 LBS

## 2024-06-14 DIAGNOSIS — Z36.9 ENCOUNTER FOR ANTENATAL SCREENING: ICD-10-CM

## 2024-06-14 DIAGNOSIS — Z31.430 ENCOUNTER OF FEMALE FOR TESTING FOR GENETIC DISEASE CARRIER STATUS FOR PROCREATIVE MANAGEMENT: ICD-10-CM

## 2024-06-14 DIAGNOSIS — Z34.01 ENCOUNTER FOR SUPERVISION OF NORMAL FIRST PREGNANCY IN FIRST TRIMESTER: Primary | ICD-10-CM

## 2024-06-14 PROCEDURE — OBC

## 2024-06-14 NOTE — PROGRESS NOTES
OB INTAKE INTERVIEW  Patient is 23 y.o. who presents for OB intake at 10wks  She is accompanied by Rodo during this encounter  The father of her baby (Rodo Ambrocio) is involved in the pregnancy and is 23 years old.      Last Menstrual Period: 3/29/2024 APPROX  Ultrasound: Measured 8 weeks 3 days on   Estimated Date of Delivery: 25  Confirmed by 8 week US    Signs/Symptoms of Pregnancy  Current pregnancy symptoms: nausea  Constipation no  Headaches no  Cramping/spotting no  PICA cravings no    Diabetes-  Body mass index is 30.9 kg/m².  If patient has 1 or more, please order early 1 hour GTT  History of GDM no  BMI >35 no  History of PCOS or current metformin use no  History of LGA/macrosomic infant (4000g/9lbs) no    If patient has 2 or more, please order early 1 hour GTT  BMI>30 YES  AMA no  First degree relative with type 2 diabetes no  History of chronic HTN, hyperlipidemia, elevated A1C no  High risk race (, , ,  or ) no    Hypertension- if you answer yes to any of the following, please order baseline preeclampsia labs (cbc, comprehensive metabolic panel, urine protein creatinine ratio, uric acid)  History of of chronic HTN no  History of gestational HTN no  History of preeclampsia, eclampsia, or HELLP syndrome no  History of diabetes no  History of lupus, autoimmune disease, kidney disease no    Thyroid- if yes order TSH with reflex T4  History of thyroid disease no    Bleeding Disorder or Hx of DVT-patient or first degree relative with history of. Order the following if not done previously.   (Factor V, antithrombin III, prothrombin gene mutation, protein C and S Ag, lupus anticoagulant, anticardiolipin, beta-2 glycoprotein)   no    OB/GYN-  History of abnormal pap smear no       Date of last pap smear never had one  History of HPV no  History of Herpes/HSV no  History of other STI (gonorrhea, chlamydia, trich) no  History of prior   no  History of prior  no  History of  delivery prior to 36 weeks 6 days no  History of blood transfusion no  Ok for blood transfusion yes    Substance screening-   History of tobacco use no  Currently using tobacco no  Substance Use Screen Level (N/A, LOW, HIGH) NA    MRSA Screening-   Does the pt have a hx of MRSA? no    Immunizations:  Influenza vaccine given this season yes  Discussed Tdap vaccine yes  Discussed COVID Vaccine yes    Genetic/MFM-  Do you or your partner have a history of any of the following in yourselves or first degree relatives?  Cystic fibrosis no  Spinal muscular atrophy no  Hemoglobinopathy/Sickle Cell/Thalassemia no  Fragile X Intellectual Disability no    If yes, discuss Carrier Screening and recommend consultation with MFM/Genetic Counseling and place specific [facility] Referral for.    If no, discuss Carrier Screening being completed once in a lifetime as a standard of care lab test. Place orders for Cystic Fibrosis Gene Test (EZK234) and Spinal Muscular Atrophy DNA ([id number])      Appointment for Nuchal Translucency Ultrasound at [facility] scheduled for        Interview education  [facility] Pregnancy Essentials Book reviewed, discussed and attached to their AVS yes    Nurse/Family Partnership- patient may qualify no; referral placed no    Prenatal lab work scripts yes  Extra labs ordered:  CF/SMA screening     Aspirin/Preeclampsia Screen    Risk Level Risk Factor Recommendation   LOW Prior Uncomplicated full-term delivery no No Aspirin recommendation        MODERATE Nulliparity YES Recommend low-dose aspirin if     BMI>30 YES 2 or more moderate risk factors    Family History Preeclampsia (mother/sister) no     35yr old or greater no     Black Race, Concern for SDOH/Low Socioeconomic no     IVF Pregnancy  no     Personal History Risks (low birth weight, prior adverse preg outcome, >10yr preg interval) no         HIGH History of Preeclampsia no Recommend low-dose aspirin if      Multifetal gestation no 1 or more high risk factors    Chronic HTN no     Type 1 or 2 Diabetes no     Renal Disease no     Autoimmune Disease  no      Contraindications to ASA therapy:  NSAID/ ASA allergy: no  Nasal polyps: no  Asthma with history of ASA induced bronchospasm: no  Relative contraindications:  History of GI bleed: no  Active peptic ulcer disease: no  Severe hepatic dysfunction: no    Patient should be recommended to take ASA 162mg during this pregnancy from 12-36wks to lower her risk of preeclampsia: moderate risk criteria met- patient aware to start ASA therapy at 12 weeks          The patient has a history now or in prior pregnancy notable for:  none      Details that I feel the provider should be aware of: This is a planned and welcomed pregnancy for Alanna. She is a previous GYN patient of [facility] and this is her first pregnancy! She is overall feeling well just some nausea. She has a history of anxiety but never used medications. She is aware of PN1 labs to be completed before next visit and scheduling appointment for carrier screening. All labs slips given to patient. Blue folder given and reviewed.     PN1 visit scheduled. The patient was oriented to our practice, the navigator role, reviewed delivering physicians and [facility] for Delivery. All questions were answered.    Interviewed by: Lyndsay Connors RN

## 2024-06-21 ENCOUNTER — APPOINTMENT (OUTPATIENT)
Dept: LAB | Facility: CLINIC | Age: 24
End: 2024-06-21
Payer: COMMERCIAL

## 2024-06-21 DIAGNOSIS — Z34.01 ENCOUNTER FOR SUPERVISION OF NORMAL FIRST PREGNANCY IN FIRST TRIMESTER: ICD-10-CM

## 2024-06-21 LAB
ABO GROUP BLD: NORMAL
BACTERIA UR QL AUTO: NORMAL /HPF
BASOPHILS # BLD AUTO: 0.02 THOUSANDS/ÂΜL (ref 0–0.1)
BASOPHILS NFR BLD AUTO: 0 % (ref 0–1)
BILIRUB UR QL STRIP: NEGATIVE
BLD GP AB SCN SERPL QL: NEGATIVE
CLARITY UR: CLEAR
COLOR UR: COLORLESS
EOSINOPHIL # BLD AUTO: 0.08 THOUSAND/ÂΜL (ref 0–0.61)
EOSINOPHIL NFR BLD AUTO: 1 % (ref 0–6)
ERYTHROCYTE [DISTWIDTH] IN BLOOD BY AUTOMATED COUNT: 13.4 % (ref 11.6–15.1)
GLUCOSE UR STRIP-MCNC: NEGATIVE MG/DL
HBV SURFACE AG SER QL: NORMAL
HCT VFR BLD AUTO: 38.7 % (ref 34.8–46.1)
HCV AB SER QL: NORMAL
HGB BLD-MCNC: 12.9 G/DL (ref 11.5–15.4)
HGB UR QL STRIP.AUTO: NEGATIVE
HIV 1+2 AB+HIV1 P24 AG SERPL QL IA: NORMAL
HIV 2 AB SERPL QL IA: NORMAL
HIV1 AB SERPL QL IA: NORMAL
HIV1 P24 AG SERPL QL IA: NORMAL
IMM GRANULOCYTES # BLD AUTO: 0.04 THOUSAND/UL (ref 0–0.2)
IMM GRANULOCYTES NFR BLD AUTO: 0 % (ref 0–2)
KETONES UR STRIP-MCNC: NEGATIVE MG/DL
LEUKOCYTE ESTERASE UR QL STRIP: NEGATIVE
LYMPHOCYTES # BLD AUTO: 2.46 THOUSANDS/ÂΜL (ref 0.6–4.47)
LYMPHOCYTES NFR BLD AUTO: 26 % (ref 14–44)
MCH RBC QN AUTO: 29.9 PG (ref 26.8–34.3)
MCHC RBC AUTO-ENTMCNC: 33.3 G/DL (ref 31.4–37.4)
MCV RBC AUTO: 90 FL (ref 82–98)
MONOCYTES # BLD AUTO: 0.53 THOUSAND/ÂΜL (ref 0.17–1.22)
MONOCYTES NFR BLD AUTO: 6 % (ref 4–12)
NEUTROPHILS # BLD AUTO: 6.22 THOUSANDS/ÂΜL (ref 1.85–7.62)
NEUTS SEG NFR BLD AUTO: 67 % (ref 43–75)
NITRITE UR QL STRIP: NEGATIVE
NON-SQ EPI CELLS URNS QL MICRO: NORMAL /HPF
NRBC BLD AUTO-RTO: 0 /100 WBCS
PH UR STRIP.AUTO: 8 [PH]
PLATELET # BLD AUTO: 332 THOUSANDS/UL (ref 149–390)
PMV BLD AUTO: 10.4 FL (ref 8.9–12.7)
PROT UR STRIP-MCNC: NEGATIVE MG/DL
RBC # BLD AUTO: 4.31 MILLION/UL (ref 3.81–5.12)
RBC #/AREA URNS AUTO: NORMAL /HPF
RH BLD: POSITIVE
RUBV IGG SERPL IA-ACNC: 17.5 IU/ML
SP GR UR STRIP.AUTO: 1.01 (ref 1–1.03)
UROBILINOGEN UR STRIP-ACNC: <2 MG/DL
WBC # BLD AUTO: 9.35 THOUSAND/UL (ref 4.31–10.16)
WBC #/AREA URNS AUTO: NORMAL /HPF

## 2024-06-21 PROCEDURE — 86900 BLOOD TYPING SEROLOGIC ABO: CPT

## 2024-06-21 PROCEDURE — 86901 BLOOD TYPING SEROLOGIC RH(D): CPT

## 2024-06-21 PROCEDURE — 87340 HEPATITIS B SURFACE AG IA: CPT

## 2024-06-21 PROCEDURE — 86780 TREPONEMA PALLIDUM: CPT

## 2024-06-21 PROCEDURE — 86762 RUBELLA ANTIBODY: CPT

## 2024-06-21 PROCEDURE — 81001 URINALYSIS AUTO W/SCOPE: CPT

## 2024-06-21 PROCEDURE — 86850 RBC ANTIBODY SCREEN: CPT

## 2024-06-21 PROCEDURE — 87389 HIV-1 AG W/HIV-1&-2 AB AG IA: CPT

## 2024-06-21 PROCEDURE — 36415 COLL VENOUS BLD VENIPUNCTURE: CPT

## 2024-06-21 PROCEDURE — 86803 HEPATITIS C AB TEST: CPT

## 2024-06-21 PROCEDURE — 87086 URINE CULTURE/COLONY COUNT: CPT

## 2024-06-21 PROCEDURE — 85025 COMPLETE CBC W/AUTO DIFF WBC: CPT

## 2024-06-22 LAB — TREPONEMA PALLIDUM IGG+IGM AB [PRESENCE] IN SERUM OR PLASMA BY IMMUNOASSAY: NORMAL

## 2024-06-22 NOTE — PROGRESS NOTES
Please refer to the [facility] ultrasound report in Ob Procedures for additional information regarding today's visit

## 2024-06-23 LAB — BACTERIA UR CULT: NORMAL

## 2024-06-24 ENCOUNTER — INITIAL PRENATAL (OUTPATIENT)
Dept: OBGYN CLINIC | Facility: CLINIC | Age: 24
End: 2024-06-24
Payer: COMMERCIAL

## 2024-06-24 VITALS
SYSTOLIC BLOOD PRESSURE: 108 MMHG | HEIGHT: 64 IN | DIASTOLIC BLOOD PRESSURE: 86 MMHG | WEIGHT: 177.8 LBS | BODY MASS INDEX: 30.35 KG/M2

## 2024-06-24 DIAGNOSIS — Z11.3 SCREENING FOR STDS (SEXUALLY TRANSMITTED DISEASES): ICD-10-CM

## 2024-06-24 DIAGNOSIS — F41.1 GENERALIZED ANXIETY DISORDER: ICD-10-CM

## 2024-06-24 DIAGNOSIS — Z34.01 PRENATAL CARE, FIRST PREGNANCY IN FIRST TRIMESTER: Primary | ICD-10-CM

## 2024-06-24 DIAGNOSIS — Z3A.12 12 WEEKS GESTATION OF PREGNANCY: ICD-10-CM

## 2024-06-24 DIAGNOSIS — Z01.419 ENCOUNTER FOR GYNECOLOGICAL EXAMINATION: ICD-10-CM

## 2024-06-24 PROBLEM — S01.23XA: Status: RESOLVED | Noted: 2023-07-07 | Resolved: 2024-06-24

## 2024-06-24 PROBLEM — B07.8 COMMON WART: Status: RESOLVED | Noted: 2020-07-16 | Resolved: 2024-06-24

## 2024-06-24 PROBLEM — Z78.9 BODY PIERCING: Status: RESOLVED | Noted: 2023-07-03 | Resolved: 2024-06-24

## 2024-06-24 PROBLEM — J34.89 DRY NARES: Status: RESOLVED | Noted: 2023-06-23 | Resolved: 2024-06-24

## 2024-06-24 PROBLEM — L08.9: Status: RESOLVED | Noted: 2023-07-07 | Resolved: 2024-06-24

## 2024-06-24 LAB
SL AMB  POCT GLUCOSE, UA: NORMAL
SL AMB POCT URINE PROTEIN: NORMAL

## 2024-06-24 PROCEDURE — 87491 CHLMYD TRACH DNA AMP PROBE: CPT | Performed by: PHYSICIAN ASSISTANT

## 2024-06-24 PROCEDURE — 87591 N.GONORRHOEAE DNA AMP PROB: CPT | Performed by: PHYSICIAN ASSISTANT

## 2024-06-24 PROCEDURE — G0145 SCR C/V CYTO,THINLAYER,RESCR: HCPCS | Performed by: PHYSICIAN ASSISTANT

## 2024-06-24 PROCEDURE — PNV: Performed by: PHYSICIAN ASSISTANT

## 2024-06-24 PROCEDURE — 81002 URINALYSIS NONAUTO W/O SCOPE: CPT | Performed by: PHYSICIAN ASSISTANT

## 2024-06-24 NOTE — ASSESSMENT & PLAN NOTE
Hx of anxiety . Currently well controlled w/o the need for medication or therapy. Reviewed potential for recurrence / worsening in pregnancy, as well as, available [facility] through [facility] and medication recommendations. To call with any worsening.

## 2024-06-24 NOTE — PROGRESS NOTES
Elda Flores is here for her first prenatal visit  Age: 23 y.o.  LMP: Patient's last menstrual period was 2024 (within days).    Gestational age 12w1d based on 1st trimester US.    1  Para 0     She nausea/vomiting and bleeding/cramping.     Prenatal labs:   O positive, antibody neg   CBC - 12.9/38.7<332  Rubella immune   HIV - non reactive  Hep B - non reactive  Hep C - non reactive   Syphilis - non reactive  UA / UC - neg      Genetic screening:   NT scan -  scheduled tomorrow  .     GYN history: Last pap smear - unsure if she has ever had this done.   Denies history of STDs.     Allergies: Patient has no known allergies.  Medication use :   Current Outpatient Medications   Medication Sig Dispense Refill    ondansetron (ZOFRAN-ODT) 4 mg disintegrating tablet Take 1 tablet (4 mg total) by mouth every 6 (six) hours as needed for nausea or vomiting 30 tablet 0    Prenatal Vit w/Fx-Utnyabjpd-OU (PNV PO) Take by mouth       No current facility-administered medications for this visit.     Social history: Denies tobacco, alcohol, or illicit drug use.  In this pregnancy her  medical history is significant for   Hx of anxiety . Currently well controlled w/o the need for medication or therapy. Reviewed potential for recurrence / worsening in pregnancy, as well as, available services through [facility] and medication recommendations. To call with any worsening.     Her obstetrical, medical, surgical and family history was reviewed    Her physical exam was normal    Fetal heart tones: 160    A Pap smear was obtained, Gonorrhea and Chlamydia testing was obtained    Discussed as well during this visit was diet, prenatal vitamins, prenatal visits, lab testing, breast feeding, vaccinations, maternal fetal medicine consultations, and lifestyle.    ASSESSMENT/PLAN   Problem List Items Addressed This Visit    None  Visit Diagnoses       Prenatal care, first pregnancy in first trimester    -  Primary     Relevant Orders    Liquid-based pap, screening    POCT urine dip (Completed)    Screening for STDs (sexually transmitted diseases)        Relevant Orders    Chlamydia/GC amplified DNA by PCR    Encounter for gynecological examination        Relevant Orders    Liquid-based pap, screening            Blue packet given and reviewed     All questions were answered & Elda expressed understanding.

## 2024-06-24 NOTE — PROGRESS NOTES
PN1   GA 12w 1d  Denies any vaginal bleeding or Leaking of fluid  Patient reports light cramping intermittently   Prenatal labs completed on 6/21/2024  AFP script placed today   Last pap: never done; Order placed today   GC/Chlamydia done today   Blue folder given/reviewed

## 2024-06-24 NOTE — ASSESSMENT & PLAN NOTE
First OB labs - completed, normal   Gc/chlamydia - collected 6/24  Pap - collected 6/24   Blue folder - has     Scheduled for NT scan tomorrow. Planning NIPT testing.

## 2024-06-25 ENCOUNTER — ROUTINE PRENATAL (OUTPATIENT)
Dept: PERINATAL CARE | Facility: OTHER | Age: 24
End: 2024-06-25
Payer: COMMERCIAL

## 2024-06-25 ENCOUNTER — APPOINTMENT (OUTPATIENT)
Dept: LAB | Facility: HOSPITAL | Age: 24
End: 2024-06-25
Attending: STUDENT IN AN ORGANIZED HEALTH CARE EDUCATION/TRAINING PROGRAM

## 2024-06-25 VITALS
DIASTOLIC BLOOD PRESSURE: 80 MMHG | WEIGHT: 174.2 LBS | HEIGHT: 64 IN | BODY MASS INDEX: 29.74 KG/M2 | SYSTOLIC BLOOD PRESSURE: 118 MMHG | HEART RATE: 85 BPM

## 2024-06-25 DIAGNOSIS — F41.9 ANXIETY DURING PREGNANCY, ANTEPARTUM, FIRST TRIMESTER: ICD-10-CM

## 2024-06-25 DIAGNOSIS — Z3A.12 12 WEEKS GESTATION OF PREGNANCY: Primary | ICD-10-CM

## 2024-06-25 DIAGNOSIS — Z13.79 ENCOUNTER FOR GENETIC SCREENING FOR DOWN SYNDROME: ICD-10-CM

## 2024-06-25 DIAGNOSIS — O99.341 ANXIETY DURING PREGNANCY, ANTEPARTUM, FIRST TRIMESTER: ICD-10-CM

## 2024-06-25 DIAGNOSIS — Z36.82 ENCOUNTER FOR ANTENATAL SCREENING FOR NUCHAL TRANSLUCENCY: ICD-10-CM

## 2024-06-25 DIAGNOSIS — O99.211 MATERNAL OBESITY, ANTEPARTUM, FIRST TRIMESTER: ICD-10-CM

## 2024-06-25 DIAGNOSIS — E66.09 OTHER OBESITY DUE TO EXCESS CALORIES AFFECTING PREGNANCY IN FIRST TRIMESTER: ICD-10-CM

## 2024-06-25 DIAGNOSIS — Z31.430 ENCOUNTER OF FEMALE FOR TESTING FOR GENETIC DISEASE CARRIER STATUS FOR PROCREATIVE MANAGEMENT: ICD-10-CM

## 2024-06-25 DIAGNOSIS — Z34.90 EARLY STAGE OF PREGNANCY: ICD-10-CM

## 2024-06-25 DIAGNOSIS — Z36.9 ENCOUNTER FOR ANTENATAL SCREENING: ICD-10-CM

## 2024-06-25 DIAGNOSIS — O99.211 OTHER OBESITY DUE TO EXCESS CALORIES AFFECTING PREGNANCY IN FIRST TRIMESTER: ICD-10-CM

## 2024-06-25 DIAGNOSIS — Z34.01 ENCOUNTER FOR SUPERVISION OF NORMAL FIRST PREGNANCY IN FIRST TRIMESTER: ICD-10-CM

## 2024-06-25 PROCEDURE — 76813 OB US NUCHAL MEAS 1 GEST: CPT | Performed by: OBSTETRICS & GYNECOLOGY

## 2024-06-25 PROCEDURE — 76801 OB US < 14 WKS SINGLE FETUS: CPT | Performed by: OBSTETRICS & GYNECOLOGY

## 2024-06-25 PROCEDURE — 36415 COLL VENOUS BLD VENIPUNCTURE: CPT | Performed by: OBSTETRICS & GYNECOLOGY

## 2024-06-25 PROCEDURE — 81220 CFTR GENE COM VARIANTS: CPT

## 2024-06-25 PROCEDURE — 99243 OFF/OP CNSLTJ NEW/EST LOW 30: CPT | Performed by: OBSTETRICS & GYNECOLOGY

## 2024-06-25 PROCEDURE — 81329 SMN1 GENE DOS/DELETION ALYS: CPT

## 2024-06-25 PROCEDURE — NC001 PR NO CHARGE: Performed by: OBSTETRICS & GYNECOLOGY

## 2024-06-25 PROCEDURE — 36415 COLL VENOUS BLD VENIPUNCTURE: CPT

## 2024-06-25 RX ORDER — ASPIRIN 81 MG/1
162 TABLET, CHEWABLE ORAL
Qty: 180 TABLET | Refills: 1 | Status: SHIPPED | OUTPATIENT
Start: 2024-06-25 | End: 2024-09-23

## 2024-06-25 NOTE — PROGRESS NOTES
Patient chose to have [facility] [id number] Non-Invasive Prenatal Screen [id number] PLUS w/ SCA, WITH fetal sex.  Patient choose to be billed through insurance.     Patient given brochure and is aware [facility] will contact patient's insurance and coordinate coverage.  Provided [facility] contact information. General inquiries [phone], Cost estimates [phone] and [facility] Billing [phone]. Website [employer].     Blood collection tubes labeled with patient identifiers (name, medical record number, and date of birth).     Filled out [facility] order form. Patient chose to have blood drawn in our office at time of visit. NIPS was drawn from right arm with a straight needle by CHIQUI Scott. .      If patient chose to have blood work drawn at a [facility] lab we requested patient notify MFM (via phone call or [employer] message) when blood collected so office can follow up on results.       Maternal Fetal Medicine will have results in approximately 5-7 business days and will call patient or notify via [employer].  Patient aware viewing lab result online will reveal fetal sex if ordered.    Patient verbalized understanding of all instructions and no questions at this time.

## 2024-06-25 NOTE — LETTER
June 25, 2024     Charlee Nick MD  [address]  [address]  [place] [address]    Patient: Elda Flores   YOB: 2000   Date of Visit: 6/25/2024       Dear Dr. Nick:    Thank you for referring Elda Flores to me for evaluation. Below are my notes for this consultation.    If you have questions, please do not hesitate to call me. I look forward to following your patient along with you.         Sincerely,        Jose Wise MD        CC: No Recipients    Jose Wise MD  6/25/2024  8:11 AM  Sign when Signing Visit  Please refer to the [facility] ultrasound report in Ob Procedures for additional information regarding today's visit

## 2024-06-26 ENCOUNTER — TELEPHONE (OUTPATIENT)
Age: 24
End: 2024-06-26

## 2024-06-26 LAB
C TRACH DNA SPEC QL NAA+PROBE: NEGATIVE
N GONORRHOEA DNA SPEC QL NAA+PROBE: NEGATIVE

## 2024-06-26 NOTE — TELEPHONE ENCOUNTER
Patient was seen in the office on 6/24 but left work early today because of nausea.        I did let her know I would send a message to her provider but she may not  approve a work note due her not being seen on 6/26.

## 2024-06-26 NOTE — LETTER
June 27, 2024     Patient: Elda Flores  YOB: 2000  Date of Visit: 6/26/2024      To Whom it May Concern:    Elda Flores is under my professional care. Elda was seen in my office on 6/24/24. Elda is 12 weeks pregnant with nausea and vomiting in pregnancy and can be excused on 6/24/24 for those symptoms.     If you have any questions or concerns, please don't hesitate to call.         Sincerely,        Brittny Son PA-C  [facility].

## 2024-06-27 LAB
LAB AP GYN PRIMARY INTERPRETATION: NORMAL
LAB AP LMP: NORMAL
Lab: NORMAL

## 2024-06-29 LAB
CFDNA.FET/CFDNA.TOTAL SFR FETUS: NORMAL %
CITATION REF LAB TEST: NORMAL
FET 13+18+21+X+Y ANEUP PLAS.CFDNA: NEGATIVE
FET CHR 21 TS PLAS.CFDNA QL: NEGATIVE
FET CHR 21 TS PLAS.CFDNA QL: NEGATIVE
FET MS X RISK WBC.DNA+CFDNA QL: NOT DETECTED
FET SEX PLAS.CFDNA DOSAGE CFDNA: NORMAL
FET TS 13 RISK PLAS.CFDNA QL: NEGATIVE
FET X + Y ANEUP RISK PLAS.CFDNA SEQ-IMP: NOT DETECTED
GA EST FROM CONCEPTION DATE: NORMAL D
GESTATIONAL AGE > 9:: YES
LAB DIRECTOR NAME PROVIDER: NORMAL
LAB DIRECTOR NAME PROVIDER: NORMAL
LABORATORY COMMENT REPORT: NORMAL
LIMITATIONS OF THE TEST: NORMAL
NEGATIVE PREDICTIVE VALUE: NORMAL
PERFORMANCE CHARACTERISTICS: NORMAL
POSITIVE PREDICTIVE VALUE: NORMAL
REF LAB TEST METHOD: NORMAL
SL AMB NOTE:: NORMAL
TEST PERFORMANCE INFO SPEC: NORMAL

## 2024-07-03 LAB
CITATION REF LAB TEST: NORMAL
CLINICAL INFO: NORMAL
ETHNIC BACKGROUND STATED: NORMAL
GENE DIS ANL CARRIER INTERP-IMP: NORMAL
GENE MUT TESTED BLD/T: NORMAL
LAB DIRECTOR NAME PROVIDER: NORMAL
REASON FOR REFERRAL (NARRATIVE): NORMAL
RECOMMENDATION PATIENT DOC-IMP: NORMAL
REF LAB TEST METHOD: NORMAL
SERVICE CMNT-IMP: NORMAL
SMN1 GENE MUT ANL BLD/T: NORMAL
SPECIMEN SOURCE: NORMAL

## 2024-07-05 LAB
CFTR FULL MUT ANL BLD/T SEQ: NORMAL
CITATION REF LAB TEST: NORMAL
CLINICAL INFO: NORMAL
ETHNIC BACKGROUND STATED: NORMAL
GENE DIS ANL CARRIER INTERP-IMP: NORMAL
INDICATION: NORMAL
LAB DIRECTOR NAME PROVIDER: NORMAL
RECOMMENDATION PATIENT DOC-IMP: NORMAL
REF LAB TEST METHOD: NORMAL
SERVICE CMNT-IMP: NORMAL
SPECIMEN SOURCE: NORMAL

## 2024-07-22 ENCOUNTER — ROUTINE PRENATAL (OUTPATIENT)
Dept: OBGYN CLINIC | Facility: MEDICAL CENTER | Age: 24
End: 2024-07-22
Payer: COMMERCIAL

## 2024-07-22 VITALS — SYSTOLIC BLOOD PRESSURE: 118 MMHG | DIASTOLIC BLOOD PRESSURE: 74 MMHG | WEIGHT: 173 LBS | BODY MASS INDEX: 29.7 KG/M2

## 2024-07-22 DIAGNOSIS — Z34.92 PRENATAL CARE IN SECOND TRIMESTER: Primary | ICD-10-CM

## 2024-07-22 DIAGNOSIS — O99.212 SEVERE OBESITY DUE TO EXCESS CALORIES AFFECTING PREGNANCY IN SECOND TRIMESTER (HCC): ICD-10-CM

## 2024-07-22 DIAGNOSIS — Z3A.16 16 WEEKS GESTATION OF PREGNANCY: ICD-10-CM

## 2024-07-22 DIAGNOSIS — Z36.9 UNSPECIFIED ANTENATAL SCREENING: ICD-10-CM

## 2024-07-22 DIAGNOSIS — E66.01 SEVERE OBESITY DUE TO EXCESS CALORIES AFFECTING PREGNANCY IN SECOND TRIMESTER (HCC): ICD-10-CM

## 2024-07-22 LAB
SL AMB  POCT GLUCOSE, UA: NORMAL
SL AMB POCT URINE PROTEIN: NORMAL

## 2024-07-22 PROCEDURE — 81002 URINALYSIS NONAUTO W/O SCOPE: CPT | Performed by: STUDENT IN AN ORGANIZED HEALTH CARE EDUCATION/TRAINING PROGRAM

## 2024-07-22 PROCEDURE — PNV: Performed by: STUDENT IN AN ORGANIZED HEALTH CARE EDUCATION/TRAINING PROGRAM

## 2024-07-22 NOTE — PROGRESS NOTES
16 weeks gestation of pregnancy  22yo  at 16.1wks presents for routine prenatal visit     Pt denies contractions, vaginal bleeding, leakage of fluid.     -continue PNVs   -AFP ordered today   -level II US 24  - labor precautions provided   -follow up in 4 weeks     Severe obesity due to excess calories affecting pregnancy in second trimester (HCC)  -encouraged to compelte early 1 hr GTT  -continue ASA

## 2024-07-22 NOTE — ASSESSMENT & PLAN NOTE
24yo  at 16.1wks presents for routine prenatal visit     Pt denies contractions, vaginal bleeding, leakage of fluid.     -continue PNVs   -AFP ordered today   -level II US 24  - labor precautions provided   -follow up in 4 weeks

## 2024-07-31 ENCOUNTER — TELEPHONE (OUTPATIENT)
Dept: OBGYN CLINIC | Facility: CLINIC | Age: 24
End: 2024-07-31

## 2024-07-31 NOTE — TELEPHONE ENCOUNTER
----- Message from Lyndsay TESFAYE sent at 2024 12:24 PM EDT -----  Regardinnd Trimester Call Due  -

## 2024-08-07 NOTE — TELEPHONE ENCOUNTER
Overall how are you doing? Life got a little hectic boyfriend was in an accident he is on bed rest with severe bruising to left side of his body    Compliant with routine OB care appointments? Scheduled to 36w    Have you completed your 1st trimester labs? Done- except 1 hr gtt- going on friday    If you had NIPS with MFM, do you have a order for MSAFP? Ordered going on friday   Can be completed 15w-22w9d, ideally 16w-18w    Have you seen MFM and do you have your detailed US scheduled? 8/23    Pregnancy Education-have you had a chance to review the classes offered and registered? Reminded to sign up    EPDS Score pending

## 2024-08-08 ENCOUNTER — OFFICE VISIT (OUTPATIENT)
Dept: URGENT CARE | Facility: CLINIC | Age: 24
End: 2024-08-08
Payer: COMMERCIAL

## 2024-08-08 VITALS
DIASTOLIC BLOOD PRESSURE: 89 MMHG | BODY MASS INDEX: 29.87 KG/M2 | RESPIRATION RATE: 19 BRPM | SYSTOLIC BLOOD PRESSURE: 130 MMHG | TEMPERATURE: 98 F | OXYGEN SATURATION: 98 % | HEART RATE: 68 BPM | WEIGHT: 174 LBS

## 2024-08-08 DIAGNOSIS — L02.91 ABSCESS: Primary | ICD-10-CM

## 2024-08-08 PROCEDURE — 10060 I&D ABSCESS SIMPLE/SINGLE: CPT | Performed by: STUDENT IN AN ORGANIZED HEALTH CARE EDUCATION/TRAINING PROGRAM

## 2024-08-08 PROCEDURE — 99213 OFFICE O/P EST LOW 20 MIN: CPT | Performed by: STUDENT IN AN ORGANIZED HEALTH CARE EDUCATION/TRAINING PROGRAM

## 2024-08-08 NOTE — PROGRESS NOTES
[facility]        NAME: Elda Flores is a 23 y.o. female  : 2000    MRN: [id number]  DATE: 2024  TIME: 6:14 PM    Assessment and Orders   Abscess [L02.91]  1. Abscess  Incision and drain            Plan and Discussion      Symptoms and exam consistent with abscess of left calf. I&D performed. No surrounding erythema so should not require antibiotics. Monitor carefully.        Risks and benefits discussed. Patient understands and agrees with the plan.     PATIENT INSTRUCTIONS      If tests have been performed at [facility], our office will contact you with results if changes need to be made to the care plan discussed with you at the visit.  You can review your full results on [facility].    Follow up with PCP.     If any of the following occur, please report to your nearest ED for evaluation or call 911.   Difficultly breathing or shortness of breath  Chest pain  Acutely worsening symptoms.         Chief Complaint     Chief Complaint   Patient presents with    Rash     Rash bug bite  today on left thigh, gotten bigger, hot to the touch, very painful.  Patient is pregnant.          History of Present Illness       Not itchy, just pain. Seemed to get warmed and more inflamed throughout the day.     Rash  This is a new problem. The current episode started today. The problem has been rapidly worsening since onset. The affected locations include the left lower leg. The rash is characterized by swelling, redness and pain. Pertinent negatives include no fever or itching.       Review of Systems   Review of Systems   Constitutional:  Negative for fever.   Skin:  Positive for rash. Negative for itching.         Current Medications       Current Outpatient Medications:     aspirin 81 mg chewable tablet, Chew 2 tablets (162 mg total) daily at bedtime, Disp: 180 tablet, Rfl: 1    ondansetron (ZOFRAN-ODT) 4 mg disintegrating tablet, Take 1 tablet (4 mg total) by mouth every 6 (six) hours as  needed for nausea or vomiting, Disp: 30 tablet, Rfl: 0    Prenatal Vit w/At-Rdzwbtvhc-NL (PNV PO), Take by mouth, Disp: , Rfl:     Current Allergies     Allergies as of 08/08/2024    (No Known Allergies)            The following portions of the patient's history were reviewed and updated as appropriate: allergies, current medications, past family history, past medical history, past social history, past surgical history and problem list.     Past Medical History:   Diagnosis Date    Anxiety     no medications       No past surgical history on file.    Family History   Problem Relation Age of Onset    Alcohol abuse Father     Liver disease Father     No Known Problems Half-Sister     Asthma Half-Brother     No Known Problems Maternal Grandmother     Colon cancer Maternal Grandfather     Skin cancer Maternal Grandfather     Liver disease Paternal Grandfather     No Known Problems Half-Sister     Asthma Half-Brother     Breast cancer Neg Hx     Ovarian cancer Neg Hx     Uterine cancer Neg Hx          Medications have been verified.        Objective   /89   Pulse 68   Temp 98 °F (36.7 °C)   Resp 19   Wt 78.9 kg (174 lb)   LMP 03/29/2024 (Within Days)   SpO2 98%   BMI 29.87 kg/m²   Patient's last menstrual period was 03/29/2024 (within days).       Physical Exam     Physical Exam  Constitutional:       General: She is not in acute distress.  Pulmonary:      Effort: Pulmonary effort is normal. No respiratory distress.   Skin:         Neurological:      General: No focal deficit present.      Mental Status: She is alert and oriented to person, place, and time.   Psychiatric:         Mood and Affect: Mood normal.         Behavior: Behavior normal.               Tamica Ren DO     Incision and drain    Date/Time: 8/8/2024 3:00 PM    Performed by: Tamica Ren DO  Authorized by: Tamica Ren DO  Universal Protocol:  Consent: Verbal consent obtained.  Consent given by: patient  Patient understanding:  patient states understanding of the procedure being performed  Patient consent: the patient's understanding of the procedure matches consent given    Patient location:  Clinic  Location:     Type:  Abscess    Size:  1 cm    Location:  Lower extremity    Lower extremity location:  L leg  Pre-procedure details:     Skin preparation:  Betadine  Procedure details:     Complexity:  Simple    Needle aspiration: no      Incision types:  Stab incision    Scalpel blade:  11    Approach:  Open    Incision depth:  Subcutaneous    Wound management:  Probed and deloculated    Drainage:  Bloody    Drainage amount:  Scant    Wound treatment:  Wound left open  Post-procedure details:     Patient tolerance of procedure:  Tolerated well, no immediate complications  Comments:      One steri strip used to close wound loosely but still allow for drainage as needed

## 2024-08-08 NOTE — LETTER
August 8, 2024     Patient: Elda Flores   YOB: 2000   Date of Visit: 8/8/2024       To Whom It May Concern:    It is my medical opinion that Elda Flores may return to work on 8/9/2024 .    If you have any questions or concerns, please don't hesitate to call.         Sincerely,        Tamica Ren,     CC: No Recipients

## 2024-08-09 ENCOUNTER — APPOINTMENT (OUTPATIENT)
Dept: LAB | Facility: CLINIC | Age: 24
End: 2024-08-09
Payer: COMMERCIAL

## 2024-08-09 DIAGNOSIS — Z36.9 UNSPECIFIED ANTENATAL SCREENING: ICD-10-CM

## 2024-08-09 DIAGNOSIS — O99.211 MATERNAL OBESITY, ANTEPARTUM, FIRST TRIMESTER: ICD-10-CM

## 2024-08-09 LAB — GLUCOSE 1H P 50 G GLC PO SERPL-MCNC: 87 MG/DL (ref 70–134)

## 2024-08-09 PROCEDURE — 82105 ALPHA-FETOPROTEIN SERUM: CPT

## 2024-08-09 PROCEDURE — 82950 GLUCOSE TEST: CPT

## 2024-08-09 PROCEDURE — 36415 COLL VENOUS BLD VENIPUNCTURE: CPT

## 2024-08-12 LAB
2ND TRIMESTER 4 SCREEN SERPL-IMP: NORMAL
AFP ADJ MOM SERPL: 0.74
AFP INTERP AMN-IMP: NORMAL
AFP INTERP SERPL-IMP: NORMAL
AFP INTERP SERPL-IMP: NORMAL
AFP SERPL-MCNC: 34.4 NG/ML
AGE AT DELIVERY: 25.3 YR
GA METHOD: NORMAL
GA: 19 WEEKS
IDDM PATIENT QL: NO
MULTIPLE PREGNANCY: NO
NEURAL TUBE DEFECT RISK FETUS: NORMAL %

## 2024-08-19 NOTE — PATIENT INSTRUCTIONS

## 2024-08-23 ENCOUNTER — ROUTINE PRENATAL (OUTPATIENT)
Dept: PERINATAL CARE | Facility: OTHER | Age: 24
End: 2024-08-23
Payer: COMMERCIAL

## 2024-08-23 ENCOUNTER — ROUTINE PRENATAL (OUTPATIENT)
Dept: OBGYN CLINIC | Facility: MEDICAL CENTER | Age: 24
End: 2024-08-23
Payer: COMMERCIAL

## 2024-08-23 VITALS
HEIGHT: 64 IN | SYSTOLIC BLOOD PRESSURE: 118 MMHG | WEIGHT: 179 LBS | DIASTOLIC BLOOD PRESSURE: 70 MMHG | HEART RATE: 63 BPM | BODY MASS INDEX: 30.56 KG/M2

## 2024-08-23 VITALS
DIASTOLIC BLOOD PRESSURE: 72 MMHG | SYSTOLIC BLOOD PRESSURE: 130 MMHG | HEIGHT: 64 IN | HEART RATE: 85 BPM | WEIGHT: 179 LBS | BODY MASS INDEX: 30.56 KG/M2

## 2024-08-23 DIAGNOSIS — Z36.86 ENCOUNTER FOR ANTENATAL SCREENING FOR CERVICAL LENGTH: ICD-10-CM

## 2024-08-23 DIAGNOSIS — Z36.3 ENCOUNTER FOR ANTENATAL SCREENING FOR MALFORMATION: ICD-10-CM

## 2024-08-23 DIAGNOSIS — Z3A.20 20 WEEKS GESTATION OF PREGNANCY: ICD-10-CM

## 2024-08-23 DIAGNOSIS — Z34.92 PRENATAL CARE IN SECOND TRIMESTER: ICD-10-CM

## 2024-08-23 DIAGNOSIS — O21.9 NAUSEA/VOMITING IN PREGNANCY: ICD-10-CM

## 2024-08-23 DIAGNOSIS — O99.212 OBESITY AFFECTING PREGNANCY IN SECOND TRIMESTER, UNSPECIFIED OBESITY TYPE: ICD-10-CM

## 2024-08-23 DIAGNOSIS — Z3A.20 20 WEEKS GESTATION OF PREGNANCY: Primary | ICD-10-CM

## 2024-08-23 DIAGNOSIS — O99.212 OTHER OBESITY DUE TO EXCESS CALORIES AFFECTING PREGNANCY IN SECOND TRIMESTER: Primary | ICD-10-CM

## 2024-08-23 DIAGNOSIS — E66.09 OTHER OBESITY DUE TO EXCESS CALORIES AFFECTING PREGNANCY IN SECOND TRIMESTER: Primary | ICD-10-CM

## 2024-08-23 LAB
SL AMB  POCT GLUCOSE, UA: NORMAL
SL AMB POCT URINE PROTEIN: NORMAL

## 2024-08-23 PROCEDURE — PNV: Performed by: CLINICAL NURSE SPECIALIST

## 2024-08-23 PROCEDURE — 99213 OFFICE O/P EST LOW 20 MIN: CPT | Performed by: STUDENT IN AN ORGANIZED HEALTH CARE EDUCATION/TRAINING PROGRAM

## 2024-08-23 PROCEDURE — 76817 TRANSVAGINAL US OBSTETRIC: CPT | Performed by: STUDENT IN AN ORGANIZED HEALTH CARE EDUCATION/TRAINING PROGRAM

## 2024-08-23 PROCEDURE — 81002 URINALYSIS NONAUTO W/O SCOPE: CPT | Performed by: CLINICAL NURSE SPECIALIST

## 2024-08-23 PROCEDURE — 76811 OB US DETAILED SNGL FETUS: CPT | Performed by: STUDENT IN AN ORGANIZED HEALTH CARE EDUCATION/TRAINING PROGRAM

## 2024-08-23 RX ORDER — PROMETHAZINE HYDROCHLORIDE 25 MG/1
25 TABLET ORAL EVERY 6 HOURS PRN
Qty: 30 TABLET | Refills: 0 | Status: SHIPPED | OUTPATIENT
Start: 2024-08-23

## 2024-08-23 NOTE — ASSESSMENT & PLAN NOTE
Taking Low Dose Aspirin   Still under pre-preg wt.  Starting BMI 30.8.  today is 29.8  Still having some N/V. Has regained some but not back to baseline.  See other A&P

## 2024-08-23 NOTE — ASSESSMENT & PLAN NOTE
Still having some N/V.varies,  but on average is 3-4 times per day.  Zofran was helpful but constipating  Tried vit b6 and doxylamine and not effective  Will try phenergan  Symptoms of dehydration reviewed and to call if not keeping anything down in > 24 hrs

## 2024-08-23 NOTE — ASSESSMENT & PLAN NOTE
20w5d  Doing well  AFP/NIPT low risk results.   Level 2 US is later today at [facility]   Reviewed eloy dahl ctx and s/s PTL.   Precautions reviewed to call for - Vaginal bldg, LOF, abnormal d/c or > 4 ctx in an hour.   FM not consistent until around 28 wks.     Questions have been answered to her satisfaction.

## 2024-08-23 NOTE — PROGRESS NOTES
"[facility]: Ms. Flores was seen today for anatomic survey and cervical length screening ultrasound.  See ultrasound report under \"OB Procedures\" tab.      MDM:   I. Diagnoses/Problems addressed:  minimal  II.  Data: I reviewed 3 lab tests ordered by another provider.  III.  Risk of morbidity:  minimal    Please don't hesitate to contact our office with any concerns or questions.  -Shi Penaloza MD    "

## 2024-08-23 NOTE — PROGRESS NOTES
Prenatal Visit  Subjective:   Elda is a 23 y.o.  20w5d here for Routine Prenatal Visit (KEN 25/Blood type O+/Labs UTD/Urine -/-/Denies LOF, VB, or CTX./Pt has +FM/Questions or concerns- )    She denies any cramping, LOF/unusual discharge or VB.  She has noted fetal movement.     Still having some N/V.  Zofran was helpful but constipating    Level 2 US is later today    Objective:  Vitals:    24 1034   BP: 130/72   Pulse: 85     Pregravid Weight/BMI: 81.6 kg (180 lb) (BMI 30.88)  Current Weight: 81.2 kg (179 lb)   Total Weight Gain: -0.454 kg (-1 lb)     Fetal Heart Rate: 145 Fundal Height (cm): 21 cm      OBGyn Exam    Assessment & Plan:        1. Other obesity due to excess calories affecting pregnancy in second trimester  Overview:  pregest BMI 30.8  Early glucola nml- rpt 3rd trim  US growth 3rd trim  LDAsa 12-36wks    Assessment & Plan:  Taking Low Dose Aspirin   Still under pre-preg wt.  Starting BMI 30.8.  today is 29.8  Still having some N/V. Has regained some but not back to baseline.  See other A&P  2. 20 weeks gestation of pregnancy  Assessment & Plan:  20w5d  Doing well  AFP/NIPT low risk results.   Level 2 US is later today at [facility]   Reviewed eloy dahl ctx and s/s PTL.   Precautions reviewed to call for - Vaginal bldg, LOF, abnormal d/c or > 4 ctx in an hour.   FM not consistent until around 28 wks.     Questions have been answered to her satisfaction.  3. Prenatal care in second trimester  -     POCT urine dip  4. Nausea/vomiting in pregnancy  Assessment & Plan:  Still having some N/V.varies,  but on average is 3-4 times per day.  Zofran was helpful but constipating  Tried vit b6 and doxylamine and not effective  Will try phenergan  Symptoms of dehydration reviewed and to call if not keeping anything down in > 24 hrs  Orders:  -     promethazine (PHENERGAN) 25 mg tablet; Take 1 tablet (25 mg total) by mouth every 6 (six) hours as needed for nausea or vomiting      Danielle Llamas,  ROQUE  8/23/2024

## 2024-09-10 ENCOUNTER — ULTRASOUND (OUTPATIENT)
Dept: PERINATAL CARE | Facility: OTHER | Age: 24
End: 2024-09-10
Payer: COMMERCIAL

## 2024-09-10 VITALS
SYSTOLIC BLOOD PRESSURE: 118 MMHG | HEART RATE: 73 BPM | BODY MASS INDEX: 31.07 KG/M2 | WEIGHT: 182 LBS | DIASTOLIC BLOOD PRESSURE: 66 MMHG | HEIGHT: 64 IN

## 2024-09-10 DIAGNOSIS — O21.9 NAUSEA AND VOMITING DURING PREGNANCY PRIOR TO 22 WEEKS GESTATION: ICD-10-CM

## 2024-09-10 DIAGNOSIS — Z36.2 ENCOUNTER FOR FOLLOW-UP ULTRASOUND OF FETAL ANATOMY: ICD-10-CM

## 2024-09-10 DIAGNOSIS — Z3A.23 23 WEEKS GESTATION OF PREGNANCY: Primary | ICD-10-CM

## 2024-09-10 PROCEDURE — 76816 OB US FOLLOW-UP PER FETUS: CPT | Performed by: OBSTETRICS & GYNECOLOGY

## 2024-09-10 PROCEDURE — 99213 OFFICE O/P EST LOW 20 MIN: CPT | Performed by: OBSTETRICS & GYNECOLOGY

## 2024-09-10 RX ORDER — DOCUSATE SODIUM 100 MG/1
100 CAPSULE, LIQUID FILLED ORAL 2 TIMES DAILY
Qty: 30 CAPSULE | Refills: 1 | Status: SHIPPED | OUTPATIENT
Start: 2024-09-10

## 2024-09-10 RX ORDER — ONDANSETRON 4 MG/1
4 TABLET, ORALLY DISINTEGRATING ORAL EVERY 8 HOURS PRN
Qty: 30 TABLET | Refills: 1 | Status: SHIPPED | OUTPATIENT
Start: 2024-09-10 | End: 2024-10-10

## 2024-09-10 NOTE — PROGRESS NOTES
"[facility]: Ms. Flores was seen today for followup missed anatomy ultrasound.  See ultrasound report under \"OB Procedures\" tab.   The time spent on this established patient on the encounter date included 5 minutes previsit service time reviewing records and precharting, 10 minutes face-to-face service time counseling regarding results and coordinating care, and  5 minutes charting, totalling 20 minutes.  Please don't hesitate to contact our office with any concerns or questions.  -Michaela Webber MD    "

## 2024-09-11 ENCOUNTER — TELEPHONE (OUTPATIENT)
Dept: OBGYN CLINIC | Facility: CLINIC | Age: 24
End: 2024-09-11

## 2024-09-12 PROBLEM — Z3A.23 23 WEEKS GESTATION OF PREGNANCY: Status: ACTIVE | Noted: 2024-06-24

## 2024-09-12 PROBLEM — O21.9 NAUSEA/VOMITING IN PREGNANCY: Status: RESOLVED | Noted: 2024-08-23 | Resolved: 2024-09-12

## 2024-09-12 NOTE — PATIENT INSTRUCTIONS
Patient Education     Pregnancy - The Fifth Month   About this topic   It is important for you to learn how to take care of yourself to help you have a healthy baby and safe delivery. It is good to have health care throughout your pregnancy.  The fifth month of your pregnancy starts around week 19 and lasts through week 23. By knowing how far along you are, you can learn what is normal for this stage of your pregnancy and plan for what is next.  General   Growth and Development   During the fifth month of your pregnancy, here are some things you can expect.  You may:  Start to gain a little more weight. It is normal to gain about 10 to 15 pounds (4.5 to 7 kg) total in your first 5 months.  Have leg cramps. Be sure to drink plenty of water.  Have loose teeth.  Have more trouble breathing or with heartburn as your baby gets bigger  Start having [place] contractions. You may feel your belly squeezing or getting tight. Be sure to drink 6 to 8 glasses of water each day.  Notice you are able to express milk from your breasts  See stretch marks on your belly, breasts, or legs. Stay active to try and keep good muscle tone.  Be checked for gestational diabetes  Your baby's growth and development:  Your baby is covered with a thick whitish substance called vernix. This helps protect your baby’s skin.  Their genitals are able to be seen on an ultrasound. Your doctor will check your baby’s size, how much fluid there is around your baby, and all of your baby’s organs with the ultrasound.  Your baby is starting to be able to see, hear, taste, and feel touch.  The bones are starting to make blood cells.  Your baby is about 11 inches (28 cm) long and weighs about 1 pound (450 gm). Your baby is about the size of a grapefruit.  Things to Think About   Avoid alcohol, drugs, tobacco products, and second hand smoke  Do not clean your cat litter box. You could get a disease that causes birth defects to your baby.  Check with your  doctor before taking any kind of drugs. Continue to take your vitamin with folic acid.  Do you plan to breastfeed your baby?  Where will you deliver? Do you plan to take prenatal classes? If so, try to have them completed by your 8th month.  Start to think about your plans for pain control during labor.  You may want to learn about cord blood banking.  Rest and take breaks each day.  When do I need to call the doctor?   Contractions every 10 minutes or more often that do not go away with drinking water or position changes  Low, dull back pain that does not go away  Pressure in your pelvis that feels like your baby is pushing down  A gush or constant trickle of watery or bloody fluid leaking from your vagina  Cramps in your lower belly that come and go or are constant  Little to no movement felt by baby in 2 hours. Your baby should move at least 10 times every 2 hours.  Headache that does not go away, blurry vision, seeing spots or halos, increase in swelling in your hands, feet, or face, and pain under your ribs on the right side  Fever of 100.4°F (38°C) or higher  Bleeding or swelling of your gums  Urinating less, or having pain when you urinate  Vaginal bleeding with or without pain  After a car accident, fall, or any trauma to your belly  Having thoughts of harming yourself or others, or do not feel safe at home  Last Reviewed Date   2020-04-20  Consumer Information Use and Disclaimer   This generalized information is a limited summary of diagnosis, treatment, and/or medication information. It is not meant to be comprehensive and should be used as a tool to help the user understand and/or assess potential diagnostic and treatment options. It does NOT include all information about conditions, treatments, medications, side effects, or risks that may apply to a specific patient. It is not intended to be medical advice or a substitute for the medical advice, diagnosis, or treatment of a health care provider based on  the health care provider's examination and assessment of a patient’s specific and unique circumstances. Patients must speak with a health care provider for complete information about their health, medical questions, and treatment options, including any risks or benefits regarding use of medications. This information does not endorse any treatments or medications as safe, effective, or approved for treating a specific patient. UpToDate, Inc. and its affiliates disclaim any warranty or liability relating to this information or the use thereof. The use of this information is governed by the Terms of Use, available at https://www.woltersPromoteSocialuwer.com/en/know/clinical-effectiveness-terms   Copyright   Copyright © 2024 UpToDate, Inc. and its affiliates and/or licensors. All rights reserved.

## 2024-09-16 ENCOUNTER — ROUTINE PRENATAL (OUTPATIENT)
Dept: OBGYN CLINIC | Facility: CLINIC | Age: 24
End: 2024-09-16

## 2024-09-16 VITALS
HEIGHT: 64 IN | SYSTOLIC BLOOD PRESSURE: 110 MMHG | DIASTOLIC BLOOD PRESSURE: 80 MMHG | HEART RATE: 70 BPM | WEIGHT: 185.8 LBS | BODY MASS INDEX: 31.72 KG/M2

## 2024-09-16 DIAGNOSIS — Z3A.24 24 WEEKS GESTATION OF PREGNANCY: ICD-10-CM

## 2024-09-16 DIAGNOSIS — O21.9 NAUSEA/VOMITING IN PREGNANCY: ICD-10-CM

## 2024-09-16 DIAGNOSIS — O99.212 OBESITY AFFECTING PREGNANCY IN SECOND TRIMESTER, UNSPECIFIED OBESITY TYPE: ICD-10-CM

## 2024-09-16 DIAGNOSIS — Z11.3 SCREEN FOR STD (SEXUALLY TRANSMITTED DISEASE): ICD-10-CM

## 2024-09-16 DIAGNOSIS — Z34.02 PRENATAL CARE, FIRST PREGNANCY IN SECOND TRIMESTER: Primary | ICD-10-CM

## 2024-09-16 PROCEDURE — PNV: Performed by: NURSE PRACTITIONER

## 2024-09-16 NOTE — ASSESSMENT & PLAN NOTE
Here with FOB. Feels better, on zofran prn. Denies LOF/CTX/VB. Discussed fetal awareness. No concerns. Overview charting updated today.

## 2024-09-16 NOTE — PROGRESS NOTES
Patient presents for a routine prenatal visit    24W1D  Good Fetal Movement  No LOF,bleeding,discharge or cramping.  No current complaints at this time.   Anatomy u/s:   28 week labs ordered today.

## 2024-10-04 ENCOUNTER — APPOINTMENT (OUTPATIENT)
Dept: LAB | Facility: CLINIC | Age: 24
End: 2024-10-04
Payer: COMMERCIAL

## 2024-10-04 DIAGNOSIS — Z11.3 SCREEN FOR STD (SEXUALLY TRANSMITTED DISEASE): ICD-10-CM

## 2024-10-04 DIAGNOSIS — Z34.02 PRENATAL CARE, FIRST PREGNANCY IN SECOND TRIMESTER: ICD-10-CM

## 2024-10-04 LAB
BASOPHILS # BLD AUTO: 0.03 THOUSANDS/ΜL (ref 0–0.1)
BASOPHILS NFR BLD AUTO: 0 % (ref 0–1)
EOSINOPHIL # BLD AUTO: 0.05 THOUSAND/ΜL (ref 0–0.61)
EOSINOPHIL NFR BLD AUTO: 1 % (ref 0–6)
ERYTHROCYTE [DISTWIDTH] IN BLOOD BY AUTOMATED COUNT: 13.6 % (ref 11.6–15.1)
GLUCOSE 1H P 50 G GLC PO SERPL-MCNC: 105 MG/DL (ref 70–134)
HCT VFR BLD AUTO: 36.4 % (ref 34.8–46.1)
HGB BLD-MCNC: 11.8 G/DL (ref 11.5–15.4)
IMM GRANULOCYTES # BLD AUTO: 0.05 THOUSAND/UL (ref 0–0.2)
IMM GRANULOCYTES NFR BLD AUTO: 1 % (ref 0–2)
LYMPHOCYTES # BLD AUTO: 1.74 THOUSANDS/ΜL (ref 0.6–4.47)
LYMPHOCYTES NFR BLD AUTO: 21 % (ref 14–44)
MCH RBC QN AUTO: 30.6 PG (ref 26.8–34.3)
MCHC RBC AUTO-ENTMCNC: 32.4 G/DL (ref 31.4–37.4)
MCV RBC AUTO: 94 FL (ref 82–98)
MONOCYTES # BLD AUTO: 0.42 THOUSAND/ΜL (ref 0.17–1.22)
MONOCYTES NFR BLD AUTO: 5 % (ref 4–12)
NEUTROPHILS # BLD AUTO: 5.89 THOUSANDS/ΜL (ref 1.85–7.62)
NEUTS SEG NFR BLD AUTO: 72 % (ref 43–75)
NRBC BLD AUTO-RTO: 0 /100 WBCS
PLATELET # BLD AUTO: 312 THOUSANDS/UL (ref 149–390)
PMV BLD AUTO: 11.1 FL (ref 8.9–12.7)
RBC # BLD AUTO: 3.86 MILLION/UL (ref 3.81–5.12)
TREPONEMA PALLIDUM IGG+IGM AB [PRESENCE] IN SERUM OR PLASMA BY IMMUNOASSAY: NORMAL
WBC # BLD AUTO: 8.18 THOUSAND/UL (ref 4.31–10.16)

## 2024-10-04 PROCEDURE — 86780 TREPONEMA PALLIDUM: CPT

## 2024-10-04 PROCEDURE — 36415 COLL VENOUS BLD VENIPUNCTURE: CPT

## 2024-10-04 PROCEDURE — 82950 GLUCOSE TEST: CPT

## 2024-10-04 PROCEDURE — 85025 COMPLETE CBC W/AUTO DIFF WBC: CPT

## 2024-10-17 PROBLEM — Z3A.28 28 WEEKS GESTATION OF PREGNANCY: Status: ACTIVE | Noted: 2024-06-24

## 2024-10-18 ENCOUNTER — ROUTINE PRENATAL (OUTPATIENT)
Dept: OBGYN CLINIC | Facility: MEDICAL CENTER | Age: 24
End: 2024-10-18
Payer: COMMERCIAL

## 2024-10-18 VITALS
HEART RATE: 68 BPM | BODY MASS INDEX: 31.41 KG/M2 | WEIGHT: 184 LBS | HEIGHT: 64 IN | DIASTOLIC BLOOD PRESSURE: 80 MMHG | SYSTOLIC BLOOD PRESSURE: 116 MMHG

## 2024-10-18 DIAGNOSIS — Z3A.28 28 WEEKS GESTATION OF PREGNANCY: Primary | ICD-10-CM

## 2024-10-18 DIAGNOSIS — O99.212 OTHER OBESITY DUE TO EXCESS CALORIES AFFECTING PREGNANCY IN SECOND TRIMESTER: ICD-10-CM

## 2024-10-18 DIAGNOSIS — E66.09 OTHER OBESITY DUE TO EXCESS CALORIES AFFECTING PREGNANCY IN SECOND TRIMESTER: ICD-10-CM

## 2024-10-18 DIAGNOSIS — Z34.83 PRENATAL CARE, SUBSEQUENT PREGNANCY, THIRD TRIMESTER: ICD-10-CM

## 2024-10-18 LAB
DME PARACHUTE DELIVERY DATE REQUESTED: NORMAL
DME PARACHUTE ITEM DESCRIPTION: NORMAL
DME PARACHUTE ORDER STATUS: NORMAL
DME PARACHUTE SUPPLIER NAME: NORMAL
DME PARACHUTE SUPPLIER PHONE: NORMAL
SL AMB  POCT GLUCOSE, UA: NORMAL
SL AMB POCT URINE PROTEIN: NORMAL

## 2024-10-18 PROCEDURE — PNV: Performed by: CLINICAL NURSE SPECIALIST

## 2024-10-18 PROCEDURE — 81002 URINALYSIS NONAUTO W/O SCOPE: CPT | Performed by: CLINICAL NURSE SPECIALIST

## 2024-10-18 NOTE — ASSESSMENT & PLAN NOTE
28w4d  Doing well,  reports good FM  Recent labs were reviewed    Reviewed recommendation for Flu vaccine during pregnancy. Reviewed R/B side effects of vaccine vs influenza virus consequences. Declines vaccine  Also reviewed Tdap and RSV vaccine and declines as well for now but will consider    Warning signs in 3rd trimester of pregnancy reviewed as well as PTL and pre-e precautions.  Fetal activity/fetal kick counts reviewed and to call with decreased FM  3rd trimester education folder given today along with form for breast pump if Breastfeeding desires.   Reviewed availability of Childbirth education classes offered by [facility]    The patient was counseled about the labor process. She was counseled regarding potential reasons that she may need a  section including arrest of dilation/descent, non-reassuring fetal status, or worsening maternal status.      She was counseled on the risks of  including bleeding, infection, and injury to surrounding structures including the bowel and bladder. She was counseled that there are medical and surgical methods to manage excessive postpartum bleeding. She was counseled that in the event of excessive blood loss, she may require blood transfusion which includes a small risk of blood borne diseases such as hepatitis and HIV. The patient is OK with receiving a blood transfusion if necessary.  The patient had an opportunity to ask questions and signed consent.      She was counseled about the possible need for operative delivery using the vacuum or forceps and the indications for doing so. She was counseled that there is a small risk of  complications including intracranial hemorrhage, lacerations, nerve damage or fracture as well as the increased risk for more severe maternal laceration. The patient signed consent.

## 2024-10-18 NOTE — ASSESSMENT & PLAN NOTE
Doing well avoiding excess wt gain. TWG 1.814 kg (4 lb)   Has growth US scheduled next month   Taking Low Dose Aspirin till 36w.  3rd trim glucola WNL

## 2024-10-18 NOTE — PROGRESS NOTES
Prenatal Visit  Subjective:   Elda is a 24 y.o.  28w5d here for Routine Prenatal Visit (KEN 25/Blood type O+/Labs UTD/Yellow folder given and reviewed /Del consent signed/Breast pump ordered today /Tdap declined /Flu declined /RSV/Urine /GBS /Denies LOF, VB, or CTX./Pt has +FM/Questions or conerns- )    Denies unusual vaginal discharge, LOF, VB, or cramping/ctx.  She is feeling fetal movement     Thinks she may have had a stomach bug earlier this week Several episodes of vomiting and now loose stools.  Recommended foods on BRAT diet to slow things down a little.     Objective:  Vitals:    10/18/24 0804   BP: 116/80   Pulse: 68     Pregravid Weight/BMI: 81.6 kg (180 lb) (BMI 30.88)  Current Weight: 83.5 kg (184 lb)   Total Weight Gain: 1.814 kg (4 lb)     OBGyn Exam    Assessment & Plan:    1. 28 weeks gestation of pregnancy  Overview:  28 wk labs nml  Declined Flu, Tdap, and RSV (1018)  Cons-x      Assessment & Plan:  28w4d  Doing well,  reports good FM  Recent labs were reviewed    Reviewed recommendation for Flu vaccine during pregnancy. Reviewed R/B side effects of vaccine vs influenza virus consequences. Declines vaccine  Also reviewed Tdap and RSV vaccine and declines as well for now but will consider    Warning signs in 3rd trimester of pregnancy reviewed as well as PTL and pre-e precautions.  Fetal activity/fetal kick counts reviewed and to call with decreased FM  3rd trimester education folder given today along with form for breast pump if Breastfeeding desires.   Reviewed availability of Childbirth education classes offered by [facility]    The patient was counseled about the labor process. She was counseled regarding potential reasons that she may need a  section including arrest of dilation/descent, non-reassuring fetal status, or worsening maternal status.      She was counseled on the risks of  including bleeding, infection, and injury to surrounding structures including the  bowel and bladder. She was counseled that there are medical and surgical methods to manage excessive postpartum bleeding. She was counseled that in the event of excessive blood loss, she may require blood transfusion which includes a small risk of blood borne diseases such as hepatitis and HIV. The patient is OK with receiving a blood transfusion if necessary.  The patient had an opportunity to ask questions and signed consent.      She was counseled about the possible need for operative delivery using the vacuum or forceps and the indications for doing so. She was counseled that there is a small risk of  complications including intracranial hemorrhage, lacerations, nerve damage or fracture as well as the increased risk for more severe maternal laceration. The patient signed consent.     2. Prenatal care, subsequent pregnancy, third trimester  -     POCT urine dip  3. Other obesity due to excess calories affecting pregnancy in second trimester  Overview:  pregest BMI 30.8  Early glucola nml- rpt 3rd trim  US growth 3rd trim  LDAsa 12-36wks    Assessment & Plan:  Doing well avoiding excess wt gain. TWG 1.814 kg (4 lb)   Has growth US scheduled next month   Taking Low Dose Aspirin till 36w.  3rd trim glucola WNL       ROQUE Key  10/18/2024

## 2024-10-24 PROBLEM — O99.213 OBESITY AFFECTING PREGNANCY IN THIRD TRIMESTER: Status: ACTIVE | Noted: 2024-07-22

## 2024-10-24 PROBLEM — Z3A.30 30 WEEKS GESTATION OF PREGNANCY: Status: ACTIVE | Noted: 2024-06-24

## 2024-10-24 NOTE — PATIENT INSTRUCTIONS
Patient Education     Pregnancy - The Seventh Month   About this topic   It is important for you to learn how to take care of yourself to help you have a healthy baby and safe delivery. It is good to have health care throughout your pregnancy.  The seventh month of your pregnancy starts around week 29 and lasts through week 32. By knowing how far along you are, you can learn what is normal for this stage of your pregnancy and plan for what is next.  General   Your body   During the seventh month of your pregnancy, here are some things you can expect.  You may:  Have weight gain of about 15 to 20 pounds (6.8 to 9 kg) total in your first 7 months.  Have more trouble moving about and sleeping as the baby gets bigger  Have very vivid dreams  Notice more vaginal discharge  Notice a creamy, watery substance leaking from your nipples  Need a shot called Rh immune globulin if your blood type may be different from your baby's  Your baby's growth and development:  Your baby is gaining weight and adding layers of fat.  Your baby turns to be head down, into the position for delivery.  They are able to respond to loud noises and to dream.  Your baby moves at least 10 times in 2 hours. If your baby isn't moving this much, talk to your doctor right away.  Your baby is about 16 inches (42 cm) long and weighs about 4 pounds (1,800 gm). Your baby is about the size of squash.  Things to Think About   Avoid alcohol, drugs, tobacco products, and second hand smoke  Think about what you want your baby's birth to be like. Who do you want with you?  Find out about what lactation services are covered by your insurance.  Look into lactation consultants and breastfeeding classes.  Where do you want to deliver? It’s time to make a plan for labor and delivery.  Plan on getting a car seat and other things for your baby.  Talk to your doctor if you plan to travel or get on a plane.  When do I need to call the doctor?   Contractions every 10  minutes or more often that do not go away with drinking water or position changes.  Low, dull back pain that does not go away  Feeling unusually dizzy or tired  Pressure in your pelvis that feels like your baby is pushing down  A gush or constant trickle of watery or bloody fluid leaking from your vagina  Cramps in your lower belly that come and go or are constant  Little to no movement felt by baby in 2 hours. Your baby should be moving at least 10 times every 2 hours.  Headache that does not go away; blurry vision; seeing spots or halos; increase in swelling in your hands, feet, or face; and pain under your ribs on the right side  Vaginal bleeding with or without pain  Fever of 100.4°F (38°C) or higher  After a car accident, fall, or any trauma to your belly  Having thoughts of harming yourself or others, or do not feel safe at home  Last Reviewed Date   2020-05-06  Consumer Information Use and Disclaimer   This generalized information is a limited summary of diagnosis, treatment, and/or medication information. It is not meant to be comprehensive and should be used as a tool to help the user understand and/or assess potential diagnostic and treatment options. It does NOT include all information about conditions, treatments, medications, side effects, or risks that may apply to a specific patient. It is not intended to be medical advice or a substitute for the medical advice, diagnosis, or treatment of a health care provider based on the health care provider's examination and assessment of a patient’s specific and unique circumstances. Patients must speak with a health care provider for complete information about their health, medical questions, and treatment options, including any risks or benefits regarding use of medications. This information does not endorse any treatments or medications as safe, effective, or approved for treating a specific patient. [employer] and its affiliates disclaim any warranty or  liability relating to this information or the use thereof. The use of this information is governed by the Terms of Use, available at https://www.wolterskluwer.com/en/know/clinical-effectiveness-terms   Copyright   Copyright © 2024 [facility] and its affiliates and/or licensors. All rights reserved.

## 2024-10-28 ENCOUNTER — OFFICE VISIT (OUTPATIENT)
Dept: URGENT CARE | Facility: CLINIC | Age: 24
End: 2024-10-28
Payer: COMMERCIAL

## 2024-10-28 ENCOUNTER — ROUTINE PRENATAL (OUTPATIENT)
Dept: OBGYN CLINIC | Facility: CLINIC | Age: 24
End: 2024-10-28
Payer: COMMERCIAL

## 2024-10-28 VITALS
BODY MASS INDEX: 31.58 KG/M2 | WEIGHT: 185 LBS | DIASTOLIC BLOOD PRESSURE: 80 MMHG | HEIGHT: 64 IN | SYSTOLIC BLOOD PRESSURE: 118 MMHG

## 2024-10-28 VITALS
BODY MASS INDEX: 31.58 KG/M2 | OXYGEN SATURATION: 99 % | RESPIRATION RATE: 18 BRPM | SYSTOLIC BLOOD PRESSURE: 116 MMHG | TEMPERATURE: 97.8 F | DIASTOLIC BLOOD PRESSURE: 84 MMHG | WEIGHT: 184 LBS | HEART RATE: 74 BPM

## 2024-10-28 DIAGNOSIS — J02.9 SORE THROAT: ICD-10-CM

## 2024-10-28 DIAGNOSIS — Z3A.30 30 WEEKS GESTATION OF PREGNANCY: ICD-10-CM

## 2024-10-28 DIAGNOSIS — F41.1 GENERALIZED ANXIETY DISORDER: ICD-10-CM

## 2024-10-28 DIAGNOSIS — O99.213 OBESITY AFFECTING PREGNANCY IN THIRD TRIMESTER, UNSPECIFIED OBESITY TYPE: ICD-10-CM

## 2024-10-28 DIAGNOSIS — J06.9 ACUTE URI: Primary | ICD-10-CM

## 2024-10-28 DIAGNOSIS — Z34.83 PRENATAL CARE, SUBSEQUENT PREGNANCY, THIRD TRIMESTER: Primary | ICD-10-CM

## 2024-10-28 LAB
S PYO AG THROAT QL: NEGATIVE
SL AMB  POCT GLUCOSE, UA: ABNORMAL
SL AMB POCT URINE PROTEIN: ABNORMAL

## 2024-10-28 PROCEDURE — 81002 URINALYSIS NONAUTO W/O SCOPE: CPT | Performed by: NURSE PRACTITIONER

## 2024-10-28 PROCEDURE — PNV: Performed by: NURSE PRACTITIONER

## 2024-10-28 PROCEDURE — 99282 EMERGENCY DEPT VISIT SF MDM: CPT | Performed by: PHYSICIAN ASSISTANT

## 2024-10-28 PROCEDURE — 87070 CULTURE OTHR SPECIMN AEROBIC: CPT | Performed by: PHYSICIAN ASSISTANT

## 2024-10-28 PROCEDURE — 87636 SARSCOV2 & INF A&B AMP PRB: CPT | Performed by: PHYSICIAN ASSISTANT

## 2024-10-28 PROCEDURE — G0381 LEV 2 HOSP TYPE B ED VISIT: HCPCS | Performed by: PHYSICIAN ASSISTANT

## 2024-10-28 PROCEDURE — 87147 CULTURE TYPE IMMUNOLOGIC: CPT | Performed by: PHYSICIAN ASSISTANT

## 2024-10-28 NOTE — LETTER
October 28, 2024     Patient: Elda Flores   YOB: 2000   Date of Visit: 10/28/2024       To Whom it May Concern:    Elda Flores was seen in my clinic on 10/28/2024. She may return to work on 10/30/2024 .    If you have any questions or concerns, please don't hesitate to call.         Sincerely,          Arianna Solo PA-C        CC: No Recipients

## 2024-10-28 NOTE — PROGRESS NOTES
Problem   30 Weeks Gestation of Pregnancy    28 wk labs nml  Declined Flu and Tdap    KEN 1/5/25  Blood type O+  Labs UTD  Yellow folder given and reviewed  Del consent signed  Breast pump ordered today  Tdap declined  Flu declined  Denies LOF, VB, or CTX.  Pt has +FM  TWG 2.268 kg (5 lb)              30 weeks gestation of pregnancy  Here with FOB. She feels ok, dealing with a URI/sore throat. She was advised to see her PCP or urgent care for workup.  She is not sure if she is having fevers, had a chill yesterday.  She had diarrhea on Saturday but it has resolved since. She was advised to hydrate since her urine was very concentrated today. She denies LOF/CTX/VB. She did not have any other concerns today. We discussed fetal kick counting. Overview charting updated today.

## 2024-10-28 NOTE — ASSESSMENT & PLAN NOTE
Here with FOB. She feels ok, dealing with a URI/sore throat. She was advised to see her PCP or urgent care for workup.  She is not sure if she is having fevers, had a chill yesterday.  She had diarrhea on Saturday but it has resolved since. She was advised to hydrate since her urine was very concentrated today. She denies LOF/CTX/VB. She did not have any other concerns today. We discussed fetal kick counting. Overview charting updated today.

## 2024-10-28 NOTE — PROGRESS NOTES
[facility]        NAME: Elda Flores is a 24 y.o. female  : 2000    MRN: [id number]  DATE: 2024  TIME: 11:09 AM    Assessment and Plan   Acute URI [J06.9]  1. Acute URI  Throat culture    Covid/Flu- Office Collect Normal      2. Sore throat  POCT rapid ANTIGEN strepA    Throat culture            Patient Instructions     Patient Instructions   Discussed negative rapid strep test with patient Will send for culture. We will call the patient if the culture is positive and if antibiotics are indicated.  Will also send PCR testing for COVID flu.    Discussed supportive symptomatic management for sore throat symptoms.        Follow up with PCP in 3-5 days.  Proceed to  ER if symptoms worsen.    If tests are performed, our office will contact you with results only if changes need to made to the care plan discussed with you at the visit. [facility]      Chief Complaint     Chief Complaint   Patient presents with    Sore Throat     Pt c/o sore throat runny nose nausea for that has been going on for 3 days and no otc meds         History of Present Illness       Sore Throat   This is a new problem. Episode onset: 3 days ago. The problem has been unchanged. There has been no fever. Associated symptoms include congestion. Pertinent negatives include no coughing, shortness of breath, swollen glands or trouble swallowing. She has tried nothing for the symptoms.       Review of Systems   Review of Systems   HENT:  Positive for congestion and sore throat. Negative for trouble swallowing.    Respiratory:  Negative for cough and shortness of breath.    All other systems reviewed and are negative.        Current Medications       Current Outpatient Medications:     aspirin 81 mg chewable tablet, Chew 2 tablets (162 mg total) daily at bedtime, Disp: 180 tablet, Rfl: 1    Doxylamine Succinate, Sleep, (UNISOM PO), Take by mouth, Disp: , Rfl:     Prenatal Vit  w/Xl-Zlcbdbrue-IR (PNV PO), Take by mouth, Disp: , Rfl:     ondansetron (ZOFRAN-ODT) 4 mg disintegrating tablet, Take 1 tablet (4 mg total) by mouth every 8 (eight) hours as needed for nausea or vomiting Take one half tab to start. (Patient not taking: Reported on 10/18/2024), Disp: 30 tablet, Rfl: 1    Pyridoxine HCl (B-6 PO), Take by mouth (Patient not taking: Reported on 10/28/2024), Disp: , Rfl:     Current Allergies     Allergies as of 10/28/2024    (No Known Allergies)            The following portions of the patient's history were reviewed and updated as appropriate: allergies, current medications, past family history, past medical history, past social history, past surgical history and problem list.     Past Medical History:   Diagnosis Date    Anxiety     no medications       History reviewed. No pertinent surgical history.    Family History   Problem Relation Age of Onset    Alcohol abuse Father     Liver disease Father     No Known Problems Half-Sister     Asthma Half-Brother     No Known Problems Maternal Grandmother     Colon cancer Maternal Grandfather     Skin cancer Maternal Grandfather     Liver disease Paternal Grandfather     No Known Problems Half-Sister     Asthma Half-Brother     Breast cancer Neg Hx     Ovarian cancer Neg Hx     Uterine cancer Neg Hx          Medications have been verified.        Objective   /84   Pulse 74   Temp 97.8 °F (36.6 °C) (Tympanic)   Resp 18   Wt 83.5 kg (184 lb)   LMP 03/29/2024 (Within Days)   SpO2 99%   BMI 31.58 kg/m²        Physical Exam     Physical Exam  Vitals and nursing note reviewed.   Constitutional:       Appearance: Normal appearance.   HENT:      Right Ear: Tympanic membrane, ear canal and external ear normal.      Left Ear: Tympanic membrane, ear canal and external ear normal.      Nose: Nose normal.      Mouth/Throat:      Mouth: Mucous membranes are moist.      Pharynx: No oropharyngeal exudate or posterior oropharyngeal erythema.    Cardiovascular:      Rate and Rhythm: Normal rate and regular rhythm.   Pulmonary:      Effort: Pulmonary effort is normal.      Breath sounds: Normal breath sounds.   Skin:     General: Skin is warm and dry.   Neurological:      General: No focal deficit present.      Mental Status: She is alert and oriented to person, place, and time.   Psychiatric:         Mood and Affect: Mood normal.         Behavior: Behavior normal.

## 2024-10-28 NOTE — PATIENT INSTRUCTIONS
Discussed negative rapid strep test with patient Will send for culture. We will call the patient if the culture is positive and if antibiotics are indicated.  Will also send PCR testing for COVID flu.    Discussed supportive symptomatic management for sore throat symptoms.        Follow up with PCP in 3-5 days.  Proceed to  ER if symptoms worsen.    If tests are performed, our office will contact you with results only if changes need to made to the care plan discussed with you at the visit. You can review your full results on [facility].

## 2024-10-29 LAB
FLUAV RNA RESP QL NAA+PROBE: NEGATIVE
FLUBV RNA RESP QL NAA+PROBE: NEGATIVE
SARS-COV-2 RNA RESP QL NAA+PROBE: NEGATIVE

## 2024-10-31 LAB — BACTERIA THROAT CULT: ABNORMAL

## 2024-11-01 ENCOUNTER — TELEPHONE (OUTPATIENT)
Dept: URGENT CARE | Facility: CLINIC | Age: 24
End: 2024-11-01

## 2024-11-01 ENCOUNTER — TELEPHONE (OUTPATIENT)
Age: 24
End: 2024-11-01

## 2024-11-01 DIAGNOSIS — J02.0 PHARYNGITIS DUE TO GROUP B BETA HEMOLYTIC STREPTOCOCCI: Primary | ICD-10-CM

## 2024-11-01 DIAGNOSIS — B95.1 PHARYNGITIS DUE TO GROUP B BETA HEMOLYTIC STREPTOCOCCI: Primary | ICD-10-CM

## 2024-11-01 DIAGNOSIS — Z3A.30 30 WEEKS GESTATION OF PREGNANCY: ICD-10-CM

## 2024-11-01 RX ORDER — AMOXICILLIN 500 MG/1
500 CAPSULE ORAL EVERY 12 HOURS SCHEDULED
Qty: 20 CAPSULE | Refills: 0 | Status: SHIPPED | OUTPATIENT
Start: 2024-11-01 | End: 2024-11-11

## 2024-11-01 NOTE — TELEPHONE ENCOUNTER
"Patient called, discussed throat culture results, pt asked \"what is actually going on with me\", states she didn't fully understand her dx. Reviewed provider note from 10/28/2024, discussed and reviewed with patient what a acute URI. Common colds can last for upwards of 2 weeks. Discussed symptoms management such as:   -Encourage fluids  -Rest  -Nasal saline spray, sinus rinse or neti-pot  -Rhinocort, Flonase or Nasonex as directed on packaging  -OTC Zytrec or Claritin  -Guaifenesin  -Delsym, Robitussin, Robitussin DM  -Tylenol for pain/fever  -Salt water gargles and chloraseptic spray  -Tsp of honey  -Warm tea with honey. May use lemon.  -Throat lozenges  -Warm compresses over sinuses  -Steam treatment (utilize proper safety precautions when in contact with hot water/steam)  -Vicks VapoRub  All questions answered. Will send in abx as discussed. Pt is currently 30 weeks pregnant.   "

## 2024-11-01 NOTE — TELEPHONE ENCOUNTER
30w5d OB patient seen this week and had cold like symptoms so was referred to the urgent care. Patient called in stating she was seen at the urgent care and had a series of throat cultures done- all negative but seen on her mychart something was abnormal with the throat culture. Results have not yet been reviewed so she wanted to know if we had any idea of what it might be. Informed patient, since culture does not have a formal interpretation on it, would recommend she reach out to the urgent care. Patient states she will, no further questions.

## 2024-11-02 ENCOUNTER — HOSPITAL ENCOUNTER (OUTPATIENT)
Facility: HOSPITAL | Age: 24
Discharge: HOME | End: 2024-11-02
Attending: STUDENT IN AN ORGANIZED HEALTH CARE EDUCATION/TRAINING PROGRAM | Admitting: STUDENT IN AN ORGANIZED HEALTH CARE EDUCATION/TRAINING PROGRAM
Payer: COMMERCIAL

## 2024-11-02 ENCOUNTER — NURSE TRIAGE (OUTPATIENT)
Dept: OTHER | Facility: OTHER | Age: 24
End: 2024-11-02

## 2024-11-02 VITALS
RESPIRATION RATE: 18 BRPM | SYSTOLIC BLOOD PRESSURE: 114 MMHG | WEIGHT: 185 LBS | DIASTOLIC BLOOD PRESSURE: 77 MMHG | TEMPERATURE: 98 F | HEIGHT: 64 IN | HEART RATE: 72 BPM | BODY MASS INDEX: 31.58 KG/M2 | OXYGEN SATURATION: 97 %

## 2024-11-02 PROBLEM — O47.9 UTERINE CONTRACTIONS: Status: ACTIVE | Noted: 2024-11-02

## 2024-11-02 PROBLEM — O26.899 VAGINAL DISCHARGE DURING PREGNANCY: Status: ACTIVE | Noted: 2024-11-02

## 2024-11-02 PROBLEM — N89.8 VAGINAL DISCHARGE DURING PREGNANCY: Status: ACTIVE | Noted: 2024-11-02

## 2024-11-02 PROCEDURE — 99213 OFFICE O/P EST LOW 20 MIN: CPT

## 2024-11-02 PROCEDURE — NC001 PR NO CHARGE: Performed by: STUDENT IN AN ORGANIZED HEALTH CARE EDUCATION/TRAINING PROGRAM

## 2024-11-02 RX ORDER — FAMOTIDINE 10 MG
10 TABLET ORAL 2 TIMES DAILY
COMMUNITY

## 2024-11-02 NOTE — TELEPHONE ENCOUNTER
"Pt called stating she thinks she lost her mucus plug as she has vaginal discharge that was \"a large clear lump of jelly\".  Pt also reports she may be leaking fluid as \"it is always wet down there\"  Pt unsure if she is having contractions as she does get tightening of abd that is intermittent.  No bleeding reported.  Advice offered per protocol and per on call provider.  Pt will monitor fluid leakage and monitor for contractions and will call back if symptoms of labor.   "

## 2024-11-02 NOTE — PROGRESS NOTES
"L&D Triage Note - OB/GYN  Elda Flores 24 y.o. female MRN: [id number]  Unit/Bed#:  TRIAGE  Encounter: [id number]    Patient is seen by SLOGA    ASSESSMENT  Elda Flores is a 24 y.o.  at 30w6d who presented for leakage of fluid and contractions. She is stable with workup not suggestive of rupture of membranes or labor at this time.    * Vaginal discharge during pregnancy  Assessment & Plan  Patient presenting with leakage of fluid. Workup not suggestive of rupture of membranes at this time; symptoms likely 2/2 urination vs. increased vaginal discharge. No evidence of membrane rupture on exam or labs; TAUS with PALMA of 20.60. FHR reactive, toco with contractions q5min, SVE closed/long/high  - continue routine prenatal care  - return precautions given    Uterine contractions  Assessment & Plan  Patient presenting with contractions q5min, mild. SVE closed/long/high. Workup unconvincing for labor at this time; symptoms likely 2/2 prelabor contractions  - recommend good hydration  - continue routine prenatal care  - return precautions given      #Discharge instructions  - patient instructed to call/return if experiencing worsening contractions, vaginal bleeding, loss of fluid, or decreased fetal movement  - next OB appointment: 11/15/24    Patient discussed with attending physician Dr. Nick  ______________    SUBJECTIVE  KEN: Estimated Date of Delivery: 25  HPI:  24 y.o.  @30w6d with no significant past medical history presents with complaint of discharge and contractions. This pregnancy is uncomplicated.    Patient reports loss of mucus plug at approx 1100 this AM with continued leakage since. She also reports intermittent abdominal tightening. She denies vaginal bleeding or decreased fetal movement.    OBJECTIVE:  Vitals:  /77 (BP Location: Left arm)   Pulse 72   Temp 98 °F (36.7 °C) (Oral)   Resp 18   Ht 5' 4\" (1.626 m)   Wt 83.9 kg (185 lb)   LMP 2024 (Within Days)   SpO2 97% "   BMI 31.76 kg/m²   Body mass index is 31.76 kg/m².    Physical Exam  Vitals reviewed.   Constitutional:       General: She is not in acute distress.     Appearance: She is well-developed.   HENT:      Head: Normocephalic and atraumatic.   Cardiovascular:      Rate and Rhythm: Normal rate.   Pulmonary:      Effort: Pulmonary effort is normal. No respiratory distress.   Abdominal:      Palpations: Abdomen is soft.      Tenderness: There is no abdominal tenderness. There is no guarding.      Comments: gravid   Genitourinary:     Comments: Normal appearing external female genitalia  Normal appearing urethral meatus  Speculum exam       Normal appearing vaginal epithelium       Physiologic vaginal discharge present       No bleeding or pooling       Grossly normal appearing cervix, not grossly dilated  SVE: closed/long/high    Skin:     Findings: No rash (on exposed skin).   Neurological:      Mental Status: She is alert and oriented to person, place, and time.   Psychiatric:         Mood and Affect: Affect normal.         Speech: Speech normal.         Behavior: Behavior normal.       Microscopy:   Infection:   - no clue cells    - no hyphae   - no trichomonads present   Membrane status:   - no ferning   - neg nitrazene    FHT:  Baseline Rate (FHR): 125 bpm  Variability: Moderate  Accelerations: 10 x 10 (<32 weeks)  Decelerations: Variable, Matthieu not <80 bpm, For < 60 seconds    TOCO:   Contraction Frequency (minutes): irregular  Contraction Duration (seconds): 40-80  Contraction Intensity: Mild    IMAGING:       TVUS   Cervical length         - 4.6 cm         - 4.52 cm         - 4.55 cm   Average: 4.56cm        TAUS   PALMA      - Q1 6.13 cm     - Q2 2.63 cm     - Q3 2.91 cm     - Q4 8.93 cm     - Total: 20.60 cm            Labs: No results found for this or any previous visit (from the past 24 hour(s)).    Aman Baptiste MD  OBGYN PGY-1  11/03/24  8:55 AM

## 2024-11-02 NOTE — TELEPHONE ENCOUNTER
"Reason for Disposition  • Normal vaginal discharge in pregnancy    Answer Assessment - Initial Assessment Questions  1. DISCHARGE: \"Describe the discharge.\" (e.g., white, yellow, green, gray, foamy, cottage cheese-like)      Clear clump of jelly   2. ODOR: \"Is there a bad odor?\"      No odor to the discharge   3. ONSET: \"When did the discharge begin?\"      Just occurred a few minutes ago   4. RASH: \"Is there a rash in that area?\" If Yes, ask: \"Describe it.\" (e.g., redness, blisters, sores, bumps)      N/a   5. ABDOMEN PAIN: \"Are you having any abdomen pain?\" If Yes, ask: \"What does it feel like?\" (e.g., crampy, dull, intermittent, constant)       Cramping that starts and then stops   6. ABDOMEN PAIN SEVERITY: If present, ask: \"How bad is it?\"  (e.g., Scale 1-10; mild, moderate, or severe)      \"3\"  7. CAUSE: \"What do you think is causing the discharge?\"      Unsure if it was my mucus plug   8. OTHER SYMPTOMS: \"Do you have any other symptoms?\" (e.g., fever, itching, vaginal bleeding, pain with urination)      Urinating more frequently and has been more constipated and I may be leaking something because it is \"always wet down there\"   9. KEN: \"What date are you expecting to deliver?\"       01/05/2025  10. PREGNANCY: \"How many weeks pregnant are you?\"        30 weeks 6 days    Protocols used: Pregnancy - Vaginal Discharge-Adult-AH    "

## 2024-11-02 NOTE — TELEPHONE ENCOUNTER
"Regardin weeks/ mucus plug show  ----- Message from Rg DWYER sent at 2024 11:02 AM EDT -----  \" I am 30 weeks and my mucus plug just came out.\"    "

## 2024-11-02 NOTE — DISCHARGE INSTR - AVS FIRST PAGE
Please return if you experience any of the following:  - strong or regular contractions  - vaginal bleeding  - loss of fluid from the vagina  - decreased fetal movement

## 2024-11-03 PROBLEM — O26.899 VAGINAL DISCHARGE DURING PREGNANCY: Chronic | Status: ACTIVE | Noted: 2024-11-02

## 2024-11-03 PROBLEM — N89.8 VAGINAL DISCHARGE DURING PREGNANCY: Chronic | Status: ACTIVE | Noted: 2024-11-02

## 2024-11-03 NOTE — ASSESSMENT & PLAN NOTE
Patient presenting with contractions q5min, mild. SVE closed/long/high. Workup unconvincing for labor at this time; symptoms likely 2/2 prelabor contractions  - recommend good hydration  - continue routine prenatal care  - return precautions given

## 2024-11-03 NOTE — ASSESSMENT & PLAN NOTE
Patient presenting with leakage of fluid. Workup not suggestive of rupture of membranes at this time; symptoms likely 2/2 urination vs. increased vaginal discharge. No evidence of membrane rupture on exam or labs; TAUS with PALMA of 20.60. FHR reactive, toco with contractions q5min, SVE closed/long/high  - continue routine prenatal care  - return precautions given

## 2024-11-11 PROBLEM — Z3A.32 32 WEEKS GESTATION OF PREGNANCY: Status: ACTIVE | Noted: 2024-06-24

## 2024-11-15 ENCOUNTER — ROUTINE PRENATAL (OUTPATIENT)
Dept: OBGYN CLINIC | Facility: MEDICAL CENTER | Age: 24
End: 2024-11-15
Payer: COMMERCIAL

## 2024-11-15 ENCOUNTER — ULTRASOUND (OUTPATIENT)
Dept: PERINATAL CARE | Facility: OTHER | Age: 24
End: 2024-11-15
Payer: COMMERCIAL

## 2024-11-15 VITALS
HEART RATE: 76 BPM | SYSTOLIC BLOOD PRESSURE: 104 MMHG | BODY MASS INDEX: 31.76 KG/M2 | DIASTOLIC BLOOD PRESSURE: 78 MMHG | HEIGHT: 64 IN

## 2024-11-15 VITALS
HEART RATE: 57 BPM | SYSTOLIC BLOOD PRESSURE: 112 MMHG | HEIGHT: 64 IN | WEIGHT: 189 LBS | DIASTOLIC BLOOD PRESSURE: 74 MMHG | BODY MASS INDEX: 32.27 KG/M2

## 2024-11-15 DIAGNOSIS — Z23 ENCOUNTER FOR IMMUNIZATION: ICD-10-CM

## 2024-11-15 DIAGNOSIS — O99.213 OTHER OBESITY DUE TO EXCESS CALORIES AFFECTING PREGNANCY IN THIRD TRIMESTER: Primary | ICD-10-CM

## 2024-11-15 DIAGNOSIS — E66.09 OTHER OBESITY DUE TO EXCESS CALORIES AFFECTING PREGNANCY IN THIRD TRIMESTER: Primary | ICD-10-CM

## 2024-11-15 DIAGNOSIS — Z3A.32 32 WEEKS GESTATION OF PREGNANCY: ICD-10-CM

## 2024-11-15 DIAGNOSIS — Z34.83 PRENATAL CARE, SUBSEQUENT PREGNANCY, THIRD TRIMESTER: ICD-10-CM

## 2024-11-15 DIAGNOSIS — E66.811 CLASS 1 OBESITY DUE TO EXCESS CALORIES WITHOUT SERIOUS COMORBIDITY WITH BODY MASS INDEX (BMI) OF 30.0 TO 30.9 IN ADULT: ICD-10-CM

## 2024-11-15 DIAGNOSIS — O35.8XX0 MATERNAL CARE FOR OTHER (SUSPECTED) FETAL ABNORMALITY AND DAMAGE, NOT APPLICABLE OR UNSPECIFIED: ICD-10-CM

## 2024-11-15 DIAGNOSIS — E66.09 CLASS 1 OBESITY DUE TO EXCESS CALORIES WITHOUT SERIOUS COMORBIDITY WITH BODY MASS INDEX (BMI) OF 30.0 TO 30.9 IN ADULT: ICD-10-CM

## 2024-11-15 LAB
SL AMB  POCT GLUCOSE, UA: NORMAL
SL AMB POCT URINE PROTEIN: NORMAL

## 2024-11-15 PROCEDURE — 81002 URINALYSIS NONAUTO W/O SCOPE: CPT | Performed by: CLINICAL NURSE SPECIALIST

## 2024-11-15 PROCEDURE — 76816 OB US FOLLOW-UP PER FETUS: CPT | Performed by: OBSTETRICS & GYNECOLOGY

## 2024-11-15 PROCEDURE — 90471 IMMUNIZATION ADMIN: CPT | Performed by: CLINICAL NURSE SPECIALIST

## 2024-11-15 PROCEDURE — 90715 TDAP VACCINE 7 YRS/> IM: CPT | Performed by: CLINICAL NURSE SPECIALIST

## 2024-11-15 PROCEDURE — 99213 OFFICE O/P EST LOW 20 MIN: CPT | Performed by: OBSTETRICS & GYNECOLOGY

## 2024-11-15 PROCEDURE — PNV: Performed by: CLINICAL NURSE SPECIALIST

## 2024-11-15 NOTE — LETTER
November 15, 2024     ROQUE Power  [address] [facility]  [address] [place] [address]    Patient: Elda Flores   YOB: 2000   Date of Visit: 11/15/2024       Dear Dr. Llamas:    Thank you for referring Elda Flores to me for evaluation. Below are my notes for this consultation.    If you have questions, please do not hesitate to call me. I look forward to following your patient along with you.         Sincerely,        Lyndsay Gallagher MD        CC: No Recipients    Lyndsay Gallagher MD  11/15/2024 10:57 AM  Sign when Signing Visit  Elda Flores has no complaints today.  She reports regular fetal movements and does not report any problems.  She is here today at 32w5d for an ultrasound for fetal growth and views of the missed anatomy of the right hand.    Problem list:  Obesity based on a pregravid BMI of 30. She had a  normal diabetes screen.    Ultrasound findings:  The ultrasound today shows normal interval fetal growth and fluid.  The right hand is seen and appears normal.  The placental cord insertion is seen and appears normal.  The right lateral ventricle appears prominent with the choroid plexus mildly dangling in appearance and measures 0.95 to 0.98 cm which is top normal. In size.  The rest of the cranial anatomy appears normal.     Pregnancy ultrasound has limitations and is unable to detect all forms of fetal congenital abnormalities.  The inaccuracy in the EFW can be off by 1 lb either way in the third trimester.    Counseling:   Lateral ventricle measurements greater than 1 cm are associated with a diagnosis of mild ventriculomegaly.  I suspect the measurements we see today which are top normal are just a normal variant.  The rest of the cranial anatomy appears normal.  Recommend a follow-up ultrasound in 4 weeks to make sure we are not seeing evidence of early dilation of the lateral ventricles that will progress.     Follow up recommended:   Recommend 1 further ultrasound  No for fetal growth and to monitor the development of the right lateral ventricle in the fetal head.     Pre visit time reviewing her records   5 minutes  Face to face time 10 minutes  Post visit time on documentation of note, updating her problem list, adding orders and prescriptions 10 minutes.  Procedures that were completed today were charged separately.   The level of decision making was low level complexity.    Lyndsay Gallagher MD

## 2024-11-15 NOTE — PROGRESS NOTES
Elda Flores has no complaints today.  She reports regular fetal movements and does not report any problems.  She is here today at 32w5d for an ultrasound for fetal growth and views of the missed anatomy of the right hand.    Problem list:  Obesity based on a pregravid BMI of 30. She had a  normal diabetes screen.    Ultrasound findings:  The ultrasound today shows normal interval fetal growth and fluid.  The right hand is seen and appears normal.  The placental cord insertion is seen and appears normal.  The right lateral ventricle appears prominent with the choroid plexus mildly dangling in appearance and measures 0.95 to 0.98 cm which is top normal. In size.  The rest of the cranial anatomy appears normal.     Pregnancy ultrasound has limitations and is unable to detect all forms of fetal congenital abnormalities.  The inaccuracy in the EFW can be off by 1 lb either way in the third trimester.    Counseling:   Lateral ventricle measurements greater than 1 cm are associated with a diagnosis of mild ventriculomegaly.  I suspect the measurements we see today which are top normal are just a normal variant.  The rest of the cranial anatomy appears normal.  Recommend a follow-up ultrasound in 4 weeks to make sure we are not seeing evidence of early dilation of the lateral ventricles that will progress.     Follow up recommended:   Recommend 1 further ultrasound for fetal growth and to monitor the development of the right lateral ventricle in the fetal head.     Pre visit time reviewing her records   5 minutes  Face to face time 10 minutes  Post visit time on documentation of note, updating her problem list, adding orders and prescriptions 10 minutes.  Procedures that were completed today were charged separately.   The level of decision making was low level complexity.    Lyndsay Gallagher MD

## 2024-11-15 NOTE — PROGRESS NOTES
"Prenatal Visit  Name: Elda Flores      : 2000      MRN: [id number]  Encounter Provider: ROQUE Key  Encounter Date: 11/15/2024   Encounter department: [facility]    :  Assessment & Plan  Other obesity due to excess calories affecting pregnancy in third trimester  TWG 4.082 kg (9 lb)  doing well. Normal 3rd trimester labs.  Growth US 32 wks today was WNL overall EFW 34%   Taking Low Dose Aspirin as recommended        32 weeks gestation of pregnancy    , 32w5d  No major complaints today.   Reports good FM  Agreed to tdap today.  Declines RSV and Flu vaccine  Reminded pt to choose a pediatrician if not already done    Contraceptive options were reviewed both hormonal and non-hormonal.  If planning to breastfeed would avoid estrogen containing products as this may affect milk supply.Declines cpmtraception Discussed breastfeeding and return to fertility PP and encouraged consistent condom Us    We again reviewed the S/S PTL and importance of consistent/regular FM. Reviewed process for FKC and encouraged pt to call with any decreases in fetal movement       Prenatal care, subsequent pregnancy, third trimester    Orders:    POCT urine dip    Encounter for immunization    Orders:    Tdap Vaccine greater than or equal to 6yo          Subjective:   History of Present Illness       Elda is a 24 y.o.  32w5d here for Routine Prenatal Visit (KEN 25 /Blood type O+ /Labs UTD/Del consent signed /Breast pump ordered today /Tdap given today /Flu declined /RSV declined /Urine /GBS /Denies LOF, VB, or CTX. /Pt has +FM /Questions or conerns- )    Denies unusual vaginal discharge, LOF, VB, or ctx. Reports Fetus is active.   Was seen in triage and found to not be in labor.  Cervix closed.         Objective:  Objective   /74 (BP Location: Left arm, Patient Position: Sitting, Cuff Size: Standard)   Pulse 57   Ht 5' 4\" (1.626 m)   Wt 85.7 kg (189 lb)   " LMP 03/29/2024 (Within Days)   BMI 32.44 kg/m²     Pregravid Weight/BMI: 81.6 kg (180 lb) (BMI 30.88)  Current Weight: 85.7 kg (189 lb)   Total Weight Gain: 4.082 kg (9 lb)     OBGyn Exam  Fetal Heart Rate: 140 , Fundal Height (cm): 33 cm    ROQUE Key  11/15/2024

## 2024-11-15 NOTE — ASSESSMENT & PLAN NOTE
, 32w5d  No major complaints today.   Reports good FM  Agreed to tdap today.  Declines RSV and Flu vaccine  Reminded pt to choose a pediatrician if not already done    Contraceptive options were reviewed both hormonal and non-hormonal.  If planning to breastfeed would avoid estrogen containing products as this may affect milk supply.Declines cpmtraception Discussed breastfeeding and return to fertility PP and encouraged consistent condom Us    We again reviewed the S/S PTL and importance of consistent/regular FM. Reviewed process for FKC and encouraged pt to call with any decreases in fetal movement

## 2024-11-15 NOTE — ASSESSMENT & PLAN NOTE
TWG 4.082 kg (9 lb)  doing well. Normal 3rd trimester labs.  Growth US 32 wks today was WNL overall EFW 34%   Taking Low Dose Aspirin as recommended

## 2024-11-21 ENCOUNTER — TELEPHONE (OUTPATIENT)
Dept: OBGYN CLINIC | Facility: CLINIC | Age: 24
End: 2024-11-21

## 2024-11-21 NOTE — TELEPHONE ENCOUNTER
Appointments: up to date  3rd Trimester Labs: done  Hospital Readiness Video: sent  Breast Pump ordered: order was canceled?

## 2024-11-21 NOTE — TELEPHONE ENCOUNTER
----- Message from Lyndsay TESFAYE sent at 6/14/2024 12:25 PM EDT -----  Regarding: 3rd Trimester Call Due  10/27-11/24

## 2024-11-23 PROBLEM — Z3A.34 34 WEEKS GESTATION OF PREGNANCY: Status: ACTIVE | Noted: 2024-06-24

## 2024-11-23 NOTE — PROGRESS NOTES
Prenatal Visit  Name: Elda Flores      : 2000      MRN: [id number]  Encounter Provider: ROQUE Key  Encounter Date: 2024   Encounter department: [facility]    :  Assessment & Plan  Other obesity due to excess calories affecting pregnancy in third trimester  TWG 5.443 kg (12 lb)   Taking Low Dose Aspirin- advised to stop at 36 wks         34 weeks gestation of pregnancy  , 33w6d  Overall pt doing well.  reports good FM  Reviewed common mood changes towards end of pregnancy and s/s Postpartum depression to be aware of.   Reviewed benefits of perineal massage - to be done 1-4 times per week x 5-10 minutes.  Educated on GBS culture which will be completed at the 36 wk visit.   We again reviewed the S/S PTL and importance of consistent/regular FM. Reviewed process for FKC and encouraged pt to call with any decreases in fetal movement       Pregnancy eruption in third trimester  C/O rash on abd/chest - exam with pimple like rash on upper back, chest and extending down upper abdomen. No hand/feet involvement and not plaque like. Liking atopic eruption of pregnancy.   Pt reports only slightly itchy.  Recommended topical hydrocortisone to start. Gentle soap- consider oatmeal soap          Prenatal care, subsequent pregnancy, third trimester    Orders:    POCT urine dip    Maternal care for other (suspected) fetal abnormality and damage, not applicable or unspecified                 History of Present Illness   History of Present Illness     Elda is a 24 y.o.  33w6d here for Routine Prenatal Visit (KEN 25/Blood type O+/Labs UTD/Del consent signed/Breast pump ordered today/Tdap 11/15/24/Flu declined/RSV declined/Urine/GBS/Denies LOF, VB, or CTX./Pt has +FM/Questions or conerns)    Denies unusual vaginal discharge, LOF, VB, or ctx. Reports Fetus is active- ribs hurt from baby kicking her.   C/O rash on abd/chest/back-only slightly  "itchy  Denies new products like soap,lotions, detergent  No hand/feet involvement      Objective   /84 (BP Location: Left arm, Patient Position: Sitting, Cuff Size: Standard)   Pulse 64   Ht 5' 4\" (1.626 m)   Wt 87.1 kg (192 lb)   LMP 03/29/2024 (Within Days)   BMI 32.96 kg/m²     Pregravid Weight/BMI: 81.6 kg (180 lb) (BMI 30.88)  Total Weight Gain: 5.443 kg (12 lb)     OBGyn Exam  Fetal Heart Rate: 145 , Fundal Height (cm): 34 cm    "

## 2024-11-23 NOTE — ASSESSMENT & PLAN NOTE
, 33w6d  Overall pt doing well.  reports good FM  Reviewed common mood changes towards end of pregnancy and s/s Postpartum depression to be aware of.   Reviewed benefits of perineal massage - to be done 1-4 times per week x 5-10 minutes.  Educated on GBS culture which will be completed at the 36 wk visit.   We again reviewed the S/S PTL and importance of consistent/regular FM. Reviewed process for FKC and encouraged pt to call with any decreases in fetal movement

## 2024-11-25 ENCOUNTER — ROUTINE PRENATAL (OUTPATIENT)
Dept: OBGYN CLINIC | Facility: MEDICAL CENTER | Age: 24
End: 2024-11-25
Payer: COMMERCIAL

## 2024-11-25 VITALS
SYSTOLIC BLOOD PRESSURE: 132 MMHG | HEART RATE: 64 BPM | HEIGHT: 64 IN | DIASTOLIC BLOOD PRESSURE: 84 MMHG | WEIGHT: 192 LBS | BODY MASS INDEX: 32.78 KG/M2

## 2024-11-25 DIAGNOSIS — E66.09 OTHER OBESITY DUE TO EXCESS CALORIES AFFECTING PREGNANCY IN THIRD TRIMESTER: ICD-10-CM

## 2024-11-25 DIAGNOSIS — O99.213 OTHER OBESITY DUE TO EXCESS CALORIES AFFECTING PREGNANCY IN THIRD TRIMESTER: ICD-10-CM

## 2024-11-25 DIAGNOSIS — Z34.83 PRENATAL CARE, SUBSEQUENT PREGNANCY, THIRD TRIMESTER: Primary | ICD-10-CM

## 2024-11-25 DIAGNOSIS — O35.8XX0 MATERNAL CARE FOR OTHER (SUSPECTED) FETAL ABNORMALITY AND DAMAGE, NOT APPLICABLE OR UNSPECIFIED: ICD-10-CM

## 2024-11-25 DIAGNOSIS — R21: ICD-10-CM

## 2024-11-25 DIAGNOSIS — Z3A.34 34 WEEKS GESTATION OF PREGNANCY: ICD-10-CM

## 2024-11-25 DIAGNOSIS — O99.713: ICD-10-CM

## 2024-11-25 LAB
SL AMB  POCT GLUCOSE, UA: NORMAL
SL AMB POCT URINE PROTEIN: NORMAL

## 2024-11-25 PROCEDURE — PNV: Performed by: CLINICAL NURSE SPECIALIST

## 2024-11-25 PROCEDURE — 81002 URINALYSIS NONAUTO W/O SCOPE: CPT | Performed by: CLINICAL NURSE SPECIALIST

## 2024-11-25 NOTE — ASSESSMENT & PLAN NOTE
C/O rash on abd/chest - exam with pimple like rash on upper back, chest and extending down upper abdomen. No hand/feet involvement and not plaque like. Liking atopic eruption of pregnancy.   Pt reports only slightly itchy.  Recommended topical hydrocortisone to start. Gentle soap- consider oatmeal soap

## 2024-12-13 ENCOUNTER — ROUTINE PRENATAL (OUTPATIENT)
Dept: OBGYN CLINIC | Facility: MEDICAL CENTER | Age: 24
End: 2024-12-13
Payer: COMMERCIAL

## 2024-12-13 ENCOUNTER — HOSPITAL ENCOUNTER (INPATIENT)
Facility: HOSPITAL | Age: 24
LOS: 3 days | Discharge: HOME | End: 2024-12-16
Attending: OBSTETRICS & GYNECOLOGY | Admitting: OBSTETRICS & GYNECOLOGY
Payer: COMMERCIAL

## 2024-12-13 VITALS — HEART RATE: 63 BPM | BODY MASS INDEX: 34.33 KG/M2 | WEIGHT: 200 LBS | OXYGEN SATURATION: 98 %

## 2024-12-13 DIAGNOSIS — Z3A.36 36 WEEKS GESTATION OF PREGNANCY: Primary | ICD-10-CM

## 2024-12-13 DIAGNOSIS — O99.213 OTHER OBESITY DUE TO EXCESS CALORIES AFFECTING PREGNANCY IN THIRD TRIMESTER: ICD-10-CM

## 2024-12-13 DIAGNOSIS — Z3A.36 36 WEEKS GESTATION OF PREGNANCY: ICD-10-CM

## 2024-12-13 DIAGNOSIS — O35.8XX0 MATERNAL CARE FOR OTHER (SUSPECTED) FETAL ABNORMALITY AND DAMAGE, NOT APPLICABLE OR UNSPECIFIED: ICD-10-CM

## 2024-12-13 DIAGNOSIS — O16.3 ELEVATED BLOOD PRESSURE AFFECTING PREGNANCY IN THIRD TRIMESTER, ANTEPARTUM: ICD-10-CM

## 2024-12-13 DIAGNOSIS — Z34.03 PRENATAL CARE, FIRST PREGNANCY IN THIRD TRIMESTER: Primary | ICD-10-CM

## 2024-12-13 DIAGNOSIS — O26.899 VAGINAL DISCHARGE DURING PREGNANCY, ANTEPARTUM: Chronic | ICD-10-CM

## 2024-12-13 DIAGNOSIS — N89.8 VAGINAL DISCHARGE DURING PREGNANCY, ANTEPARTUM: Chronic | ICD-10-CM

## 2024-12-13 DIAGNOSIS — F41.1 GENERALIZED ANXIETY DISORDER: ICD-10-CM

## 2024-12-13 DIAGNOSIS — O35.06X0 VENTRICULOMEGALY OF BRAIN ON FETAL ULTRASOUND: ICD-10-CM

## 2024-12-13 DIAGNOSIS — E66.09 OTHER OBESITY DUE TO EXCESS CALORIES AFFECTING PREGNANCY IN THIRD TRIMESTER: ICD-10-CM

## 2024-12-13 DIAGNOSIS — O14.10 SEVERE PRE-ECLAMPSIA, ANTEPARTUM: ICD-10-CM

## 2024-12-13 DIAGNOSIS — O21.9 NAUSEA/VOMITING IN PREGNANCY: ICD-10-CM

## 2024-12-13 PROBLEM — Z34.90 ENCOUNTER FOR INDUCTION OF LABOR: Status: ACTIVE | Noted: 2024-12-13

## 2024-12-13 LAB
ABO GROUP BLD: NORMAL
ALBUMIN SERPL BCG-MCNC: 3.3 G/DL (ref 3.5–5)
ALBUMIN SERPL BCG-MCNC: 3.3 G/DL (ref 3.5–5)
ALP SERPL-CCNC: 240 U/L (ref 34–104)
ALP SERPL-CCNC: 245 U/L (ref 34–104)
ALT SERPL W P-5'-P-CCNC: 45 U/L (ref 7–52)
ALT SERPL W P-5'-P-CCNC: 48 U/L (ref 7–52)
ANION GAP SERPL CALCULATED.3IONS-SCNC: 10 MMOL/L (ref 4–13)
ANION GAP SERPL CALCULATED.3IONS-SCNC: 6 MMOL/L (ref 4–13)
AST SERPL W P-5'-P-CCNC: 31 U/L (ref 13–39)
AST SERPL W P-5'-P-CCNC: 37 U/L (ref 13–39)
BASOPHILS # BLD AUTO: 0.03 THOUSANDS/ÂΜL (ref 0–0.1)
BASOPHILS NFR BLD AUTO: 0 % (ref 0–1)
BILIRUB SERPL-MCNC: 0.3 MG/DL (ref 0.2–1)
BILIRUB SERPL-MCNC: 0.36 MG/DL (ref 0.2–1)
BLD GP AB SCN SERPL QL: NEGATIVE
BUN SERPL-MCNC: 10 MG/DL (ref 5–25)
BUN SERPL-MCNC: 8 MG/DL (ref 5–25)
CALCIUM ALBUM COR SERPL-MCNC: 8.1 MG/DL (ref 8.3–10.1)
CALCIUM ALBUM COR SERPL-MCNC: 8.7 MG/DL (ref 8.3–10.1)
CALCIUM SERPL-MCNC: 7.5 MG/DL (ref 8.4–10.2)
CALCIUM SERPL-MCNC: 8.1 MG/DL (ref 8.4–10.2)
CHLORIDE SERPL-SCNC: 106 MMOL/L (ref 96–108)
CHLORIDE SERPL-SCNC: 107 MMOL/L (ref 96–108)
CO2 SERPL-SCNC: 20 MMOL/L (ref 21–32)
CO2 SERPL-SCNC: 24 MMOL/L (ref 21–32)
CREAT SERPL-MCNC: 0.75 MG/DL (ref 0.6–1.3)
CREAT SERPL-MCNC: 0.87 MG/DL (ref 0.6–1.3)
CREAT UR-MCNC: 240.8 MG/DL
EOSINOPHIL # BLD AUTO: 0.02 THOUSAND/ÂΜL (ref 0–0.61)
EOSINOPHIL NFR BLD AUTO: 0 % (ref 0–6)
ERYTHROCYTE [DISTWIDTH] IN BLOOD BY AUTOMATED COUNT: 12.6 % (ref 11.6–15.1)
ERYTHROCYTE [DISTWIDTH] IN BLOOD BY AUTOMATED COUNT: 12.7 % (ref 11.6–15.1)
GFR SERPL CREATININE-BSD FRML MDRD: 111 ML/MIN/1.73SQ M
GFR SERPL CREATININE-BSD FRML MDRD: 92 ML/MIN/1.73SQ M
GLUCOSE SERPL-MCNC: 76 MG/DL (ref 65–140)
GLUCOSE SERPL-MCNC: 90 MG/DL (ref 65–140)
HCT VFR BLD AUTO: 34.5 % (ref 34.8–46.1)
HCT VFR BLD AUTO: 35.8 % (ref 34.8–46.1)
HGB BLD-MCNC: 11.4 G/DL (ref 11.5–15.4)
HGB BLD-MCNC: 12 G/DL (ref 11.5–15.4)
IMM GRANULOCYTES # BLD AUTO: 0.06 THOUSAND/UL (ref 0–0.2)
IMM GRANULOCYTES NFR BLD AUTO: 1 % (ref 0–2)
LYMPHOCYTES # BLD AUTO: 2.22 THOUSANDS/ÂΜL (ref 0.6–4.47)
LYMPHOCYTES NFR BLD AUTO: 23 % (ref 14–44)
MAGNESIUM SERPL-MCNC: 1.8 MG/DL (ref 1.9–2.7)
MAGNESIUM SERPL-MCNC: 5.1 MG/DL (ref 1.9–2.7)
MCH RBC QN AUTO: 29.6 PG (ref 26.8–34.3)
MCH RBC QN AUTO: 29.6 PG (ref 26.8–34.3)
MCHC RBC AUTO-ENTMCNC: 33 G/DL (ref 31.4–37.4)
MCHC RBC AUTO-ENTMCNC: 33.5 G/DL (ref 31.4–37.4)
MCV RBC AUTO: 88 FL (ref 82–98)
MCV RBC AUTO: 90 FL (ref 82–98)
MONOCYTES # BLD AUTO: 0.5 THOUSAND/ÂΜL (ref 0.17–1.22)
MONOCYTES NFR BLD AUTO: 5 % (ref 4–12)
NEUTROPHILS # BLD AUTO: 6.82 THOUSANDS/ÂΜL (ref 1.85–7.62)
NEUTS SEG NFR BLD AUTO: 71 % (ref 43–75)
NRBC BLD AUTO-RTO: 0 /100 WBCS
PLATELET # BLD AUTO: 277 THOUSANDS/UL (ref 149–390)
PLATELET # BLD AUTO: 278 THOUSANDS/UL (ref 149–390)
PMV BLD AUTO: 12.2 FL (ref 8.9–12.7)
PMV BLD AUTO: 12.3 FL (ref 8.9–12.7)
POTASSIUM SERPL-SCNC: 4 MMOL/L (ref 3.5–5.3)
POTASSIUM SERPL-SCNC: 4.1 MMOL/L (ref 3.5–5.3)
PROT SERPL-MCNC: 6.5 G/DL (ref 6.4–8.4)
PROT SERPL-MCNC: 6.7 G/DL (ref 6.4–8.4)
PROT UR-MCNC: 138 MG/DL
PROT/CREAT UR: 0.6 MG/G{CREAT} (ref 0–0.1)
RBC # BLD AUTO: 3.85 MILLION/UL (ref 3.81–5.12)
RBC # BLD AUTO: 4.05 MILLION/UL (ref 3.81–5.12)
RH BLD: POSITIVE
SL AMB  POCT GLUCOSE, UA: NEGATIVE
SL AMB POCT URINE PROTEIN: ABNORMAL
SODIUM SERPL-SCNC: 136 MMOL/L (ref 135–147)
SODIUM SERPL-SCNC: 137 MMOL/L (ref 135–147)
SPECIMEN EXPIRATION DATE: NORMAL
TREPONEMA PALLIDUM IGG+IGM AB [PRESENCE] IN SERUM OR PLASMA BY IMMUNOASSAY: NORMAL
URATE SERPL-MCNC: 5.6 MG/DL (ref 2–7.5)
URATE SERPL-MCNC: 6.2 MG/DL (ref 2–7.5)
WBC # BLD AUTO: 11.03 THOUSAND/UL (ref 4.31–10.16)
WBC # BLD AUTO: 9.65 THOUSAND/UL (ref 4.31–10.16)

## 2024-12-13 PROCEDURE — 81002 URINALYSIS NONAUTO W/O SCOPE: CPT | Performed by: NURSE PRACTITIONER

## 2024-12-13 PROCEDURE — 86900 BLOOD TYPING SEROLOGIC ABO: CPT

## 2024-12-13 PROCEDURE — 84156 ASSAY OF PROTEIN URINE: CPT

## 2024-12-13 PROCEDURE — 85027 COMPLETE CBC AUTOMATED: CPT

## 2024-12-13 PROCEDURE — 80053 COMPREHEN METABOLIC PANEL: CPT

## 2024-12-13 PROCEDURE — PNV: Performed by: NURSE PRACTITIONER

## 2024-12-13 PROCEDURE — 83735 ASSAY OF MAGNESIUM: CPT

## 2024-12-13 PROCEDURE — 85025 COMPLETE CBC W/AUTO DIFF WBC: CPT

## 2024-12-13 PROCEDURE — 84550 ASSAY OF BLOOD/URIC ACID: CPT

## 2024-12-13 PROCEDURE — 99214 OFFICE O/P EST MOD 30 MIN: CPT

## 2024-12-13 PROCEDURE — 86901 BLOOD TYPING SEROLOGIC RH(D): CPT

## 2024-12-13 PROCEDURE — NC001 PR NO CHARGE: Performed by: OBSTETRICS & GYNECOLOGY

## 2024-12-13 PROCEDURE — 87150 DNA/RNA AMPLIFIED PROBE: CPT | Performed by: NURSE PRACTITIONER

## 2024-12-13 PROCEDURE — 86850 RBC ANTIBODY SCREEN: CPT

## 2024-12-13 PROCEDURE — 86780 TREPONEMA PALLIDUM: CPT

## 2024-12-13 PROCEDURE — 82570 ASSAY OF URINE CREATININE: CPT

## 2024-12-13 RX ORDER — LABETALOL HYDROCHLORIDE 5 MG/ML
20 INJECTION, SOLUTION INTRAVENOUS
Status: DISCONTINUED | OUTPATIENT
Start: 2024-12-13 | End: 2024-12-13

## 2024-12-13 RX ORDER — OXYTOCIN/RINGER'S LACTATE 30/500 ML
1-30 PLASTIC BAG, INJECTION (ML) INTRAVENOUS
Status: DISCONTINUED | OUTPATIENT
Start: 2024-12-13 | End: 2024-12-14

## 2024-12-13 RX ORDER — MAGNESIUM SULFATE HEPTAHYDRATE 40 MG/ML
2 INJECTION, SOLUTION INTRAVENOUS ONCE
Status: COMPLETED | OUTPATIENT
Start: 2024-12-13 | End: 2024-12-13

## 2024-12-13 RX ORDER — SODIUM CHLORIDE, SODIUM LACTATE, POTASSIUM CHLORIDE, CALCIUM CHLORIDE 600; 310; 30; 20 MG/100ML; MG/100ML; MG/100ML; MG/100ML
50 INJECTION, SOLUTION INTRAVENOUS CONTINUOUS
Status: DISCONTINUED | OUTPATIENT
Start: 2024-12-13 | End: 2024-12-16 | Stop reason: HOSPADM

## 2024-12-13 RX ORDER — METOCLOPRAMIDE HYDROCHLORIDE 5 MG/ML
10 INJECTION INTRAMUSCULAR; INTRAVENOUS EVERY 6 HOURS PRN
Status: DISCONTINUED | OUTPATIENT
Start: 2024-12-13 | End: 2024-12-14

## 2024-12-13 RX ORDER — HYDRALAZINE HYDROCHLORIDE 20 MG/ML
5 INJECTION INTRAMUSCULAR; INTRAVENOUS
Status: DISCONTINUED | OUTPATIENT
Start: 2024-12-13 | End: 2024-12-13

## 2024-12-13 RX ORDER — LABETALOL HYDROCHLORIDE 5 MG/ML
20 INJECTION, SOLUTION INTRAVENOUS
Status: DISCONTINUED | OUTPATIENT
Start: 2024-12-13 | End: 2024-12-14

## 2024-12-13 RX ORDER — CALCIUM GLUCONATE 94 MG/ML
1 INJECTION, SOLUTION INTRAVENOUS ONCE AS NEEDED
Status: DISCONTINUED | OUTPATIENT
Start: 2024-12-13 | End: 2024-12-16 | Stop reason: HOSPADM

## 2024-12-13 RX ORDER — BUPIVACAINE HYDROCHLORIDE 2.5 MG/ML
30 INJECTION, SOLUTION EPIDURAL; INFILTRATION; INTRACAUDAL ONCE AS NEEDED
Status: DISCONTINUED | OUTPATIENT
Start: 2024-12-13 | End: 2024-12-14

## 2024-12-13 RX ORDER — ACETAMINOPHEN 325 MG/1
975 TABLET ORAL EVERY 6 HOURS PRN
Status: DISCONTINUED | OUTPATIENT
Start: 2024-12-13 | End: 2024-12-14

## 2024-12-13 RX ORDER — MAGNESIUM SULFATE HEPTAHYDRATE 40 MG/ML
4 INJECTION, SOLUTION INTRAVENOUS ONCE
Status: COMPLETED | OUTPATIENT
Start: 2024-12-13 | End: 2024-12-13

## 2024-12-13 RX ORDER — ONDANSETRON 2 MG/ML
4 INJECTION INTRAMUSCULAR; INTRAVENOUS EVERY 6 HOURS PRN
Status: DISCONTINUED | OUTPATIENT
Start: 2024-12-13 | End: 2024-12-14

## 2024-12-13 RX ORDER — SIMETHICONE 80 MG
80 TABLET,CHEWABLE ORAL 4 TIMES DAILY PRN
Status: DISCONTINUED | OUTPATIENT
Start: 2024-12-13 | End: 2024-12-14

## 2024-12-13 RX ORDER — HYDRALAZINE HYDROCHLORIDE 20 MG/ML
5 INJECTION INTRAMUSCULAR; INTRAVENOUS
Status: DISCONTINUED | OUTPATIENT
Start: 2024-12-13 | End: 2024-12-14

## 2024-12-13 RX ORDER — CALCIUM CARBONATE 500 MG/1
1000 TABLET, CHEWABLE ORAL DAILY PRN
Status: DISCONTINUED | OUTPATIENT
Start: 2024-12-13 | End: 2024-12-14

## 2024-12-13 RX ORDER — MAGNESIUM SULFATE HEPTAHYDRATE 40 MG/ML
2 INJECTION, SOLUTION INTRAVENOUS CONTINUOUS
Status: DISCONTINUED | OUTPATIENT
Start: 2024-12-13 | End: 2024-12-15

## 2024-12-13 RX ORDER — CAFFEINE 200 MG
200 TABLET ORAL ONCE
Status: COMPLETED | OUTPATIENT
Start: 2024-12-13 | End: 2024-12-13

## 2024-12-13 RX ORDER — METOCLOPRAMIDE 10 MG/1
10 TABLET ORAL ONCE
Status: COMPLETED | OUTPATIENT
Start: 2024-12-13 | End: 2024-12-13

## 2024-12-13 RX ADMIN — SODIUM CHLORIDE, SODIUM LACTATE, POTASSIUM CHLORIDE, AND CALCIUM CHLORIDE 50 ML/HR: .6; .31; .03; .02 INJECTION, SOLUTION INTRAVENOUS at 15:40

## 2024-12-13 RX ADMIN — MAGNESIUM SULFATE HEPTAHYDRATE 4 G: 40 INJECTION, SOLUTION INTRAVENOUS at 15:47

## 2024-12-13 RX ADMIN — Medication 25 MCG: at 15:35

## 2024-12-13 RX ADMIN — MAGNESIUM SULFATE HEPTAHYDRATE 2 G: 40 INJECTION, SOLUTION INTRAVENOUS at 16:11

## 2024-12-13 RX ADMIN — MAGNESIUM SULFATE IN WATER 2 G/HR: 20 INJECTION, SOLUTION INTRAVENOUS at 16:27

## 2024-12-13 RX ADMIN — PENICILLIN G POTASSIUM 5 MILLION UNITS: 20000000 INJECTION, POWDER, FOR SOLUTION INTRAVENOUS at 19:00

## 2024-12-13 RX ADMIN — ACETAMINOPHEN 975 MG: 325 TABLET, FILM COATED ORAL at 12:32

## 2024-12-13 RX ADMIN — SODIUM CHLORIDE, SODIUM LACTATE, POTASSIUM CHLORIDE, AND CALCIUM CHLORIDE 50 ML/HR: .6; .31; .03; .02 INJECTION, SOLUTION INTRAVENOUS at 22:56

## 2024-12-13 RX ADMIN — CAFFEINE 200 MG: 200 TABLET ORAL at 12:32

## 2024-12-13 RX ADMIN — PENICILLIN G POTASSIUM 2.5 MILLION UNITS: 20000000 INJECTION, POWDER, FOR SOLUTION INTRAVENOUS at 23:00

## 2024-12-13 RX ADMIN — METOCLOPRAMIDE 10 MG: 10 TABLET ORAL at 12:32

## 2024-12-13 RX ADMIN — OXYTOCIN 2 MILLI-UNITS/MIN: 10 INJECTION INTRAVENOUS at 23:00

## 2024-12-13 RX ADMIN — Medication 25 MCG: at 18:04

## 2024-12-13 NOTE — OB LABOR/OXYTOCIN SAFETY PROGRESS
Labor Progress Note - Elda Flores 24 y.o. female MRN: [id number]    Unit/Bed#: -01 Encounter: [id number]       Contraction Frequency (minutes): 2-7  Contraction Intensity: Mild  Uterine Activity Characteristics: Irritability  Cervical Dilation: 1        Cervical Effacement: 30  Fetal Station: Ballotable  Baseline Rate (FHR): 115 bpm  Fetal Heart Rate (FHT): 121 BPM  FHR Category: I               Vital Signs:   Vitals:    12/13/24 1738   BP: 136/94   Pulse: 83   Resp:    Temp:    SpO2:        Notes/comments:   Is feeling well 3 hours after her first Cytotec dose.  She is feeling some mild cramping cramping but no regular painful contractions.   PROCEDURE:  LANIER BALLOON PLACEMENT    A 24F lanier with a 30cc balloon was selected. SVE was performed and cervix was located. Lanier was introduced over sterile gloved hands. Balloon advanced through cervix beyond the internal cervical os. A small amount amount of sterile saline solution was instilled in the balloon to confirm placement. Placement was confirmed to be beyond the internal cervical os. A total of 60cc of sterile saline solution was placed into the balloon. Pt tolerated well. Instructions left with RN to place lanier to gentle traction. Notify MD when lanier dislodged. Vaginal cytotec#2 placed. Will notify Dr. Jez Martínez MD 12/13/2024 6:07 PM  PGY-2 OB/GYN

## 2024-12-13 NOTE — H&P
H & P- Obstetrics   Elda Flores 24 y.o. female MRN: [id number]  Unit/Bed#: -01 Encounter: [id number]    ObGyn Provider:  [facility] ([facility])    Assessment: 24 y.o.  at 36w5d admitted for preeclampsia with severe features.  She presented with a nonsevere range blood pressure and in triage she had a severe range pressure that was nonsustained.  Despite offering pain medications her headache persisted and the decision was made to admit her for induction for preeclampsia with severe features.  SVE: 0/0/0  FHT Category 1  Clinical EFW: EFW 34th percentile; Cephalic confirmed by BSUS  GBS: negative  Postpartum contraception plan: condoms, declines    Plan:   Preeclampsia, severe  Assessment & Plan  Systolic (12hrs), Av , Min:127 , Max:167     Diastolic (12hrs), Av, Min:84, Max:111       * Encounter for induction of labor  Assessment & Plan  Admit to OBGYN for preeclampsia with severe features  Clear liquid diet   F/u T&S, CBC, RPR   IVF LR 50cc/hr   Continuous fetal monitoring and tocometry   Analgesia at maternal request   Vertex by TAUS  Induction plan Cytotec        Discussed case and plan w/ Vanessa Matthews MD      Chief Complaint: Headache and swelling    HPI: Elda Flores is a 24 y.o.  with an KEN of 2025, by Ultrasound at 36w5d who is being admitted for preeclampsia with severe features. She denies having uterine contractions, has no LOF, and reports no VB. She states she has felt good FM.  The patient says that today she noticed that her hands, feet, and face were more swollen than usual she also had a headache that did not resolve.  She had a severe range blood pressure in the office that was not sustained and her provider recommended that she come immediately to triage to be evaluated.  Upon evaluation and lab work she meets criteria for preeclampsia with severe features.      Her current pregnancy is notable for a medical history of general anxiety disorder.   On MFM ultrasound it was noted that the fetus has a prominent right lateral ventricle with the choroid plexus mildly dangling in appearance and measures 0.95 to 0.98 cm.  The lateral ventricle measured greater than 1 cm.  The patient was diagnosed with mild ventriculomegaly which was noted to be likely a normal variant as the rest of the cranial anatomy appeared normal.    Pregnancy Plan:  Pregnancy: Castillo  Fetal sex: male  Support person: Rodo     Delivery Plans  Planned delivery method: Vaginal  Planned delivery location: [place]/  Planned anesthesia: Epidural/IV meds/Non-Pharmacologic Pain Management Methods   Acceptable blood products: All     Post-Delivery Plans  Feeding intentions: breast milk and formula.    Review of Systems   HENT:  Positive for facial swelling.    Cardiovascular:  Positive for leg swelling. Negative for chest pain and palpitations.   Gastrointestinal:  Negative for constipation, diarrhea and nausea.   Genitourinary:  Negative for dysuria, hematuria, urgency and vaginal bleeding.       OB Hx:  OB History    Para Term  AB Living   1        SAB IAB Ectopic Multiple Live Births             # Outcome Date GA Lbr Gilberto/2nd Weight Sex Type Anes PTL Lv   1 Current                Past Medical Hx:  Past Medical History:   Diagnosis Date    Anxiety     no medications       Past Surgical hx:  No past surgical history on file.      Social History     Tobacco Use   Smoking Status Never   Smokeless Tobacco Never         No Known Allergies      Medications Prior to Admission:     ondansetron (ZOFRAN-ODT) 4 mg disintegrating tablet    Prenatal Vit w/Pu-Iymxbgdei-ES (PNV PO)    Doxylamine Succinate, Sleep, (UNISOM PO)    famotidine (PEPCID) 10 mg tablet    Pyridoxine HCl (B-6 PO)    Objective:  Temp:  [99 °F (37.2 °C)] 99 °F (37.2 °C)  HR:  [49-69] 64  BP: (127-167)/() 162/111  Resp:  [20] 20  SpO2:  [98 %] 98 %  There is no height or weight on file to calculate BMI.     Physical  Exam:  Physical Exam  Genitourinary:      Vulva and rectum normal.   HENT:      Head: Normocephalic.      Nose: Nose normal.      Mouth/Throat:      Mouth: Mucous membranes are moist.   Eyes:      Pupils: Pupils are equal, round, and reactive to light.   Cardiovascular:      Rate and Rhythm: Normal rate.      Pulses: Normal pulses.   Pulmonary:      Effort: Pulmonary effort is normal.   Abdominal:      Palpations: Abdomen is soft.   Musculoskeletal:      Cervical back: Normal range of motion.      Right foot: Swelling present.      Left foot: Swelling present.      Comments: +1 bilateral   Neurological:      Mental Status: She is alert.   Vitals reviewed. Exam conducted with a chaperone present.          Fetal monitoring:  Fetal heart rate: Baseline Rate (FHR): 130 bpm  Variability: Moderate  Accelerations: 15 x 15 or greater  Decelerations: None  [place]: Contraction Frequency (minutes): irregular    Lab Results   Component Value Date    WBC 9.65 12/13/2024    HGB 12.0 12/13/2024    HCT 35.8 12/13/2024     12/13/2024     Lab Results   Component Value Date    K 4.0 12/13/2024     12/13/2024    CO2 24 12/13/2024    BUN 8 12/13/2024    CREATININE 0.75 12/13/2024    AST 31 12/13/2024    ALT 45 12/13/2024     Prenatal Labs: Reviewed    [unfilled]  This patient's mother is not on file.  Blood type: O positive  Antibody: negative  GBS: Pending  HIV: Non-Reactive  Rubella: Immune  Syphilis IgM/IgG: Non-Reactive  HBsAg: Non-Reactive  HCAb: Non-Reactive  Chlamydia: negative  Gonorrhea: negative  Diabetes 1 hour screen: Normal  Diabetes 3 hour screen: not indicated  Platelets: 12  Hgb: 278    >2 Midnights  INPATIENT     Signature/Title:  Marcia Christina MD  Date: 12/13/2024  Time: 3:18 PM

## 2024-12-13 NOTE — ASSESSMENT & PLAN NOTE
Nausea started around 28 weeks.   Taking zofran when needed, such as for work approx 3 times per week.   She stopped taking pepcid as she was unsure if it was safe after 36 weeks. Encouraged to restart as she feels her nausea is related to her stomach acid.   Encouraged to restart B6 for nausea 3 times per day.   Vomits 3-4 times per week. Aware of her trigger foods.   Weight gain of 8 lbs since last visit one week ago.

## 2024-12-13 NOTE — PROGRESS NOTES
Problem   Prenatal Care, First Pregnancy in Third Trimester   Elevated Blood Pressure Affecting Pregnancy in Third Trimester, Antepartum   Maternal Care for Other (Suspected) Fetal Abnormality and Damage, Not Applicable Or Unspecified    Right lateral ventricle 0.95 to 0.98 cm.  Dangling choroid plexus. Cranial anatomy otherwise normal.  Suspect it is a normal variant but recommend a follow-up ultrasound for growth and repeat views at 37 weeks.     Obesity Affecting Pregnancy in Third Trimester    pregest BMI 30.8  Early glucola nml- rpt 3rd trim 105  US growth 3rd trim 11/15 EFW 34%  LDAsa 12-36wks       Generalized Anxiety Disorder   Nausea/Vomiting in Pregnancy (Resolved)   36 Weeks Gestation of Pregnancy (Resolved)    28 wk labs nml  Declined Flu and Tdap    KEN 25  Blood type O+  Labs UTD  Yellow folder given and reviewed  Del consent signed  Breast pump ordered today  Tdap declined  Flu declined  Denies LOF, VB, or CTX.  Pt has +FM  TWG 2.268 kg (5 lb)              36 weeks gestation of pregnancy  Elda Flores is a 24 y.o.  here for OB visit at 36w5d weeks accompanied by VIKRAM Koehler. TWG 5.443 kg (12 lb).  No health concerns.     Denies LOF, vaginal bleeding or contractions.   Admits to inadequate water intake.   Not performing FKC's daily 10 in 2 hours; Instructions provided and reminded that she has the written info in her yellow folder.   28 week labs were normal  Perineal massage not started. Encouraged to start.   GBS collected today.   TDAP up to date  Influenza vaccine refused. Aware of the need for antivirals if exposed.   MFM appt on 24  Pediatric CPR, childbirth,  education, breast feeding classes were not taken. Encouraged to consider.    RTO 1 weeks    Obesity affecting pregnancy in third trimester  TWG # 9.072 kg (20 lb)  GCT was normal  Limit weight gain to 11-20 lbs.   Regular exercise encouraged.   Low dose ASA was discontinued at 36 weeks.   Fetal growth scans every 4-6  weeks in 3rd trimester.    Maternal care for other (suspected) fetal abnormality and damage, not applicable or unspecified  [facility] appt 12/20/24    Nausea/vomiting in pregnancy  Nausea started around 28 weeks.   Taking zofran when needed, such as for work approx 3 times per week.   She stopped taking pepcid as she was unsure if it was safe after 36 weeks. Encouraged to restart as she feels her nausea is related to her stomach acid.   Encouraged to restart B6 for nausea 3 times per day.   Vomits 3-4 times per week. Aware of her trigger foods.   Weight gain of 8 lbs since last visit one week ago.     Generalized anxiety disorder  Stable without need for medication.  Negative 2 question depression screen.     Elevated blood pressure affecting pregnancy in third trimester, antepartum  Blood pressure of 170/120 in left arm   158/120 in right arm noted at end of visit.  MA was unable to auscultate her BP at the beginning of her appt.   Protein of +30 on urine dip in office; negative glucose  C/o HA yesterday which resolved today.   +2 Pitting edema in lower extremities and trace facial swelling  Denies SOB, CP, dizziness, blurry vision or epigastric pain.   Encouraged to proceed directly to L&D without making any stops. No eating or drinking.   Charge L&D RN and Dr Augie Dutta messaged with the above info.

## 2024-12-13 NOTE — ASSESSMENT & PLAN NOTE
Elda Flores is a 24 y.o.  here for OB visit at 36w5d weeks accompanied by VIKRAM Koehler. TWG 5.443 kg (12 lb).  No health concerns.     Denies LOF, vaginal bleeding or contractions.   Admits to inadequate water intake.   Not performing FKC's daily 10 in 2 hours; Instructions provided and reminded that she has the written info in her yellow folder.   28 week labs were normal  Perineal massage not started. Encouraged to start.   GBS collected today.   TDAP up to date  Influenza vaccine refused. Aware of the need for antivirals if exposed.   MFM appt on 24  Pediatric CPR, childbirth,  education, breast feeding classes were not taken. Encouraged to consider.    RTO 1 weeks

## 2024-12-13 NOTE — ASSESSMENT & PLAN NOTE
Blood pressure of 170/120 in left arm   158/120 in right arm noted at end of visit.  MA was unable to auscultate her BP at the beginning of her appt.   Protein of +30 on urine dip in office; negative glucose  C/o HA yesterday which resolved today.   +2 Pitting edema in lower extremities and trace facial swelling  Denies SOB, CP, dizziness, blurry vision or epigastric pain.   Encouraged to proceed directly to L&D without making any stops. No eating or drinking.   Charge L&D RN and Dr Augie Dutta messaged with the above info.

## 2024-12-13 NOTE — ASSESSMENT & PLAN NOTE
CBC, CMP wnl, UPC 0.6  BP monitoring  Mag started  1547  Labs 6/6h    Systolic (12hrs), Av , Min:118 , Max:166   Diastolic (12hrs), Av, Min:63, Max:107

## 2024-12-13 NOTE — ASSESSMENT & PLAN NOTE
TWG # 9.072 kg (20 lb)  GCT was normal  Limit weight gain to 11-20 lbs.   Regular exercise encouraged.   Low dose ASA was discontinued at 36 weeks.   Fetal growth scans every 4-6 weeks in 3rd trimester.

## 2024-12-14 ENCOUNTER — ANESTHESIA EVENT (INPATIENT)
Dept: ANESTHESIOLOGY | Facility: HOSPITAL | Age: 24
End: 2024-12-14
Payer: COMMERCIAL

## 2024-12-14 ENCOUNTER — ANESTHESIA (INPATIENT)
Dept: ANESTHESIOLOGY | Facility: HOSPITAL | Age: 24
End: 2024-12-14
Payer: COMMERCIAL

## 2024-12-14 LAB
ALBUMIN SERPL BCG-MCNC: 2.8 G/DL (ref 3.5–5)
ALBUMIN SERPL BCG-MCNC: 3 G/DL (ref 3.5–5)
ALBUMIN SERPL BCG-MCNC: 3.1 G/DL (ref 3.5–5)
ALBUMIN SERPL BCG-MCNC: 3.2 G/DL (ref 3.5–5)
ALP SERPL-CCNC: 226 U/L (ref 34–104)
ALP SERPL-CCNC: 235 U/L (ref 34–104)
ALP SERPL-CCNC: 246 U/L (ref 34–104)
ALP SERPL-CCNC: 259 U/L (ref 34–104)
ALT SERPL W P-5'-P-CCNC: 47 U/L (ref 7–52)
ALT SERPL W P-5'-P-CCNC: 56 U/L (ref 7–52)
ALT SERPL W P-5'-P-CCNC: 63 U/L (ref 7–52)
ALT SERPL W P-5'-P-CCNC: 81 U/L (ref 7–52)
ANION GAP SERPL CALCULATED.3IONS-SCNC: 8 MMOL/L (ref 4–13)
ANION GAP SERPL CALCULATED.3IONS-SCNC: 9 MMOL/L (ref 4–13)
AST SERPL W P-5'-P-CCNC: 36 U/L (ref 13–39)
AST SERPL W P-5'-P-CCNC: 47 U/L (ref 13–39)
AST SERPL W P-5'-P-CCNC: 59 U/L (ref 13–39)
AST SERPL W P-5'-P-CCNC: 78 U/L (ref 13–39)
BASE EXCESS BLDCOA CALC-SCNC: -6.2 MMOL/L (ref 3–11)
BASE EXCESS BLDCOV CALC-SCNC: -6.3 MMOL/L (ref 1–9)
BILIRUB SERPL-MCNC: 0.33 MG/DL (ref 0.2–1)
BILIRUB SERPL-MCNC: 0.44 MG/DL (ref 0.2–1)
BILIRUB SERPL-MCNC: 0.45 MG/DL (ref 0.2–1)
BILIRUB SERPL-MCNC: 0.52 MG/DL (ref 0.2–1)
BUN SERPL-MCNC: 10 MG/DL (ref 5–25)
BUN SERPL-MCNC: 7 MG/DL (ref 5–25)
BUN SERPL-MCNC: 8 MG/DL (ref 5–25)
BUN SERPL-MCNC: 9 MG/DL (ref 5–25)
CALCIUM ALBUM COR SERPL-MCNC: 7.3 MG/DL (ref 8.3–10.1)
CALCIUM ALBUM COR SERPL-MCNC: 7.4 MG/DL (ref 8.3–10.1)
CALCIUM ALBUM COR SERPL-MCNC: 7.5 MG/DL (ref 8.3–10.1)
CALCIUM ALBUM COR SERPL-MCNC: 7.5 MG/DL (ref 8.3–10.1)
CALCIUM SERPL-MCNC: 6.4 MG/DL (ref 8.4–10.2)
CALCIUM SERPL-MCNC: 6.5 MG/DL (ref 8.4–10.2)
CALCIUM SERPL-MCNC: 6.8 MG/DL (ref 8.4–10.2)
CALCIUM SERPL-MCNC: 6.9 MG/DL (ref 8.4–10.2)
CHLORIDE SERPL-SCNC: 103 MMOL/L (ref 96–108)
CHLORIDE SERPL-SCNC: 105 MMOL/L (ref 96–108)
CHLORIDE SERPL-SCNC: 105 MMOL/L (ref 96–108)
CHLORIDE SERPL-SCNC: 107 MMOL/L (ref 96–108)
CO2 SERPL-SCNC: 20 MMOL/L (ref 21–32)
CO2 SERPL-SCNC: 20 MMOL/L (ref 21–32)
CO2 SERPL-SCNC: 21 MMOL/L (ref 21–32)
CO2 SERPL-SCNC: 22 MMOL/L (ref 21–32)
CREAT SERPL-MCNC: 0.77 MG/DL (ref 0.6–1.3)
CREAT SERPL-MCNC: 0.81 MG/DL (ref 0.6–1.3)
CREAT SERPL-MCNC: 0.81 MG/DL (ref 0.6–1.3)
CREAT SERPL-MCNC: 0.92 MG/DL (ref 0.6–1.3)
ERYTHROCYTE [DISTWIDTH] IN BLOOD BY AUTOMATED COUNT: 12.7 % (ref 11.6–15.1)
ERYTHROCYTE [DISTWIDTH] IN BLOOD BY AUTOMATED COUNT: 12.7 % (ref 11.6–15.1)
ERYTHROCYTE [DISTWIDTH] IN BLOOD BY AUTOMATED COUNT: 12.8 % (ref 11.6–15.1)
ERYTHROCYTE [DISTWIDTH] IN BLOOD BY AUTOMATED COUNT: 13 % (ref 11.6–15.1)
GFR SERPL CREATININE-BSD FRML MDRD: 101 ML/MIN/1.73SQ M
GFR SERPL CREATININE-BSD FRML MDRD: 101 ML/MIN/1.73SQ M
GFR SERPL CREATININE-BSD FRML MDRD: 107 ML/MIN/1.73SQ M
GFR SERPL CREATININE-BSD FRML MDRD: 86 ML/MIN/1.73SQ M
GLUCOSE SERPL-MCNC: 124 MG/DL (ref 65–140)
GLUCOSE SERPL-MCNC: 81 MG/DL (ref 65–140)
GLUCOSE SERPL-MCNC: 84 MG/DL (ref 65–140)
GLUCOSE SERPL-MCNC: 86 MG/DL (ref 65–140)
HCO3 BLDCOA-SCNC: 22 MMOL/L (ref 17.3–27.3)
HCO3 BLDCOV-SCNC: 20.8 MMOL/L (ref 12.2–28.6)
HCT VFR BLD AUTO: 32.5 % (ref 34.8–46.1)
HCT VFR BLD AUTO: 33.3 % (ref 34.8–46.1)
HCT VFR BLD AUTO: 33.9 % (ref 34.8–46.1)
HCT VFR BLD AUTO: 34.8 % (ref 34.8–46.1)
HGB BLD-MCNC: 11 G/DL (ref 11.5–15.4)
HGB BLD-MCNC: 11.1 G/DL (ref 11.5–15.4)
HGB BLD-MCNC: 11.4 G/DL (ref 11.5–15.4)
HGB BLD-MCNC: 11.7 G/DL (ref 11.5–15.4)
MAGNESIUM SERPL-MCNC: 5.6 MG/DL (ref 1.9–2.7)
MAGNESIUM SERPL-MCNC: 6.4 MG/DL (ref 1.9–2.7)
MAGNESIUM SERPL-MCNC: 6.5 MG/DL (ref 1.9–2.7)
MAGNESIUM SERPL-MCNC: 6.6 MG/DL (ref 1.9–2.7)
MCH RBC QN AUTO: 29.4 PG (ref 26.8–34.3)
MCH RBC QN AUTO: 29.8 PG (ref 26.8–34.3)
MCH RBC QN AUTO: 30 PG (ref 26.8–34.3)
MCH RBC QN AUTO: 30 PG (ref 26.8–34.3)
MCHC RBC AUTO-ENTMCNC: 33 G/DL (ref 31.4–37.4)
MCHC RBC AUTO-ENTMCNC: 33.6 G/DL (ref 31.4–37.4)
MCHC RBC AUTO-ENTMCNC: 33.6 G/DL (ref 31.4–37.4)
MCHC RBC AUTO-ENTMCNC: 34.2 G/DL (ref 31.4–37.4)
MCV RBC AUTO: 87 FL (ref 82–98)
MCV RBC AUTO: 89 FL (ref 82–98)
O2 CT VFR BLDCOA CALC: 7.9 ML/DL
OXYHGB MFR BLDCOA: 33.6 %
OXYHGB MFR BLDCOV: 55.7 %
PCO2 BLDCOA: 53.5 MM[HG] (ref 30–60)
PCO2 BLDCOV: 46.5 MM HG (ref 27–43)
PH BLDCOA: 7.23 [PH] (ref 7.23–7.43)
PH BLDCOV: 7.27 [PH] (ref 7.19–7.49)
PLATELET # BLD AUTO: 265 THOUSANDS/UL (ref 149–390)
PLATELET # BLD AUTO: 265 THOUSANDS/UL (ref 149–390)
PLATELET # BLD AUTO: 278 THOUSANDS/UL (ref 149–390)
PLATELET # BLD AUTO: 281 THOUSANDS/UL (ref 149–390)
PMV BLD AUTO: 11.8 FL (ref 8.9–12.7)
PMV BLD AUTO: 12.1 FL (ref 8.9–12.7)
PMV BLD AUTO: 12.2 FL (ref 8.9–12.7)
PMV BLD AUTO: 12.2 FL (ref 8.9–12.7)
PO2 BLDCOA: 18 MM HG (ref 5–25)
PO2 BLDCOV: 26.4 MM HG (ref 15–45)
POTASSIUM SERPL-SCNC: 3.7 MMOL/L (ref 3.5–5.3)
POTASSIUM SERPL-SCNC: 3.8 MMOL/L (ref 3.5–5.3)
POTASSIUM SERPL-SCNC: 4.2 MMOL/L (ref 3.5–5.3)
POTASSIUM SERPL-SCNC: 4.2 MMOL/L (ref 3.5–5.3)
PROT SERPL-MCNC: 5.8 G/DL (ref 6.4–8.4)
PROT SERPL-MCNC: 6 G/DL (ref 6.4–8.4)
PROT SERPL-MCNC: 6.2 G/DL (ref 6.4–8.4)
PROT SERPL-MCNC: 6.4 G/DL (ref 6.4–8.4)
RBC # BLD AUTO: 3.72 MILLION/UL (ref 3.81–5.12)
RBC # BLD AUTO: 3.74 MILLION/UL (ref 3.81–5.12)
RBC # BLD AUTO: 3.8 MILLION/UL (ref 3.81–5.12)
RBC # BLD AUTO: 3.9 MILLION/UL (ref 3.81–5.12)
SAO2 % BLDCOV: 13.2 ML/DL
SODIUM SERPL-SCNC: 131 MMOL/L (ref 135–147)
SODIUM SERPL-SCNC: 134 MMOL/L (ref 135–147)
SODIUM SERPL-SCNC: 135 MMOL/L (ref 135–147)
SODIUM SERPL-SCNC: 136 MMOL/L (ref 135–147)
URATE SERPL-MCNC: 5.9 MG/DL (ref 2–7.5)
URATE SERPL-MCNC: 6 MG/DL (ref 2–7.5)
URATE SERPL-MCNC: 6 MG/DL (ref 2–7.5)
URATE SERPL-MCNC: 6.2 MG/DL (ref 2–7.5)
WBC # BLD AUTO: 14.68 THOUSAND/UL (ref 4.31–10.16)
WBC # BLD AUTO: 14.94 THOUSAND/UL (ref 4.31–10.16)
WBC # BLD AUTO: 15.44 THOUSAND/UL (ref 4.31–10.16)
WBC # BLD AUTO: 20.07 THOUSAND/UL (ref 4.31–10.16)

## 2024-12-14 PROCEDURE — 3E0P7VZ INTRODUCTION OF HORMONE INTO FEMALE REPRODUCTIVE, VIA NATURAL OR ARTIFICIAL OPENING: ICD-10-PCS | Performed by: STUDENT IN AN ORGANIZED HEALTH CARE EDUCATION/TRAINING PROGRAM

## 2024-12-14 PROCEDURE — 3E033VJ INTRODUCTION OF OTHER HORMONE INTO PERIPHERAL VEIN, PERCUTANEOUS APPROACH: ICD-10-PCS | Performed by: STUDENT IN AN ORGANIZED HEALTH CARE EDUCATION/TRAINING PROGRAM

## 2024-12-14 PROCEDURE — 80053 COMPREHEN METABOLIC PANEL: CPT

## 2024-12-14 PROCEDURE — 84550 ASSAY OF BLOOD/URIC ACID: CPT

## 2024-12-14 PROCEDURE — 83735 ASSAY OF MAGNESIUM: CPT

## 2024-12-14 PROCEDURE — 4A1HXCZ MONITORING OF PRODUCTS OF CONCEPTION, CARDIAC RATE, EXTERNAL APPROACH: ICD-10-PCS | Performed by: STUDENT IN AN ORGANIZED HEALTH CARE EDUCATION/TRAINING PROGRAM

## 2024-12-14 PROCEDURE — 82805 BLOOD GASES W/O2 SATURATION: CPT | Performed by: OBSTETRICS & GYNECOLOGY

## 2024-12-14 PROCEDURE — 85027 COMPLETE CBC AUTOMATED: CPT

## 2024-12-14 PROCEDURE — 88307 TISSUE EXAM BY PATHOLOGIST: CPT | Performed by: STUDENT IN AN ORGANIZED HEALTH CARE EDUCATION/TRAINING PROGRAM

## 2024-12-14 PROCEDURE — 0HQ9XZZ REPAIR PERINEUM SKIN, EXTERNAL APPROACH: ICD-10-PCS | Performed by: STUDENT IN AN ORGANIZED HEALTH CARE EDUCATION/TRAINING PROGRAM

## 2024-12-14 PROCEDURE — 88342 IMHCHEM/IMCYTCHM 1ST ANTB: CPT | Performed by: STUDENT IN AN ORGANIZED HEALTH CARE EDUCATION/TRAINING PROGRAM

## 2024-12-14 PROCEDURE — 59400 OBSTETRICAL CARE: CPT | Performed by: STUDENT IN AN ORGANIZED HEALTH CARE EDUCATION/TRAINING PROGRAM

## 2024-12-14 RX ORDER — OXYTOCIN/RINGER'S LACTATE 30/500 ML
250 PLASTIC BAG, INJECTION (ML) INTRAVENOUS CONTINUOUS
Status: ACTIVE | OUTPATIENT
Start: 2024-12-14 | End: 2024-12-14

## 2024-12-14 RX ORDER — SIMETHICONE 80 MG
80 TABLET,CHEWABLE ORAL 4 TIMES DAILY PRN
Status: DISCONTINUED | OUTPATIENT
Start: 2024-12-14 | End: 2024-12-16 | Stop reason: HOSPADM

## 2024-12-14 RX ORDER — LIDOCAINE HYDROCHLORIDE AND EPINEPHRINE 15; 5 MG/ML; UG/ML
INJECTION, SOLUTION EPIDURAL AS NEEDED
Status: DISCONTINUED | OUTPATIENT
Start: 2024-12-14 | End: 2024-12-15 | Stop reason: HOSPADM

## 2024-12-14 RX ORDER — IBUPROFEN 600 MG/1
600 TABLET, FILM COATED ORAL EVERY 6 HOURS
Status: DISCONTINUED | OUTPATIENT
Start: 2024-12-14 | End: 2024-12-16 | Stop reason: HOSPADM

## 2024-12-14 RX ORDER — BENZOCAINE/MENTHOL 6 MG-10 MG
1 LOZENGE MUCOUS MEMBRANE DAILY PRN
Status: DISCONTINUED | OUTPATIENT
Start: 2024-12-14 | End: 2024-12-16 | Stop reason: HOSPADM

## 2024-12-14 RX ORDER — ONDANSETRON 2 MG/ML
4 INJECTION INTRAMUSCULAR; INTRAVENOUS EVERY 8 HOURS PRN
Status: DISCONTINUED | OUTPATIENT
Start: 2024-12-14 | End: 2024-12-16 | Stop reason: HOSPADM

## 2024-12-14 RX ORDER — ACETAMINOPHEN 325 MG/1
650 TABLET ORAL EVERY 4 HOURS PRN
Status: DISCONTINUED | OUTPATIENT
Start: 2024-12-14 | End: 2024-12-16 | Stop reason: HOSPADM

## 2024-12-14 RX ORDER — FAMOTIDINE 10 MG/ML
20 INJECTION, SOLUTION INTRAVENOUS ONCE
Status: COMPLETED | OUTPATIENT
Start: 2024-12-14 | End: 2024-12-14

## 2024-12-14 RX ORDER — FAMOTIDINE 20 MG/1
10 TABLET, FILM COATED ORAL 2 TIMES DAILY
Status: DISCONTINUED | OUTPATIENT
Start: 2024-12-14 | End: 2024-12-16 | Stop reason: HOSPADM

## 2024-12-14 RX ORDER — DOCUSATE SODIUM 100 MG/1
100 CAPSULE, LIQUID FILLED ORAL 2 TIMES DAILY
Status: DISCONTINUED | OUTPATIENT
Start: 2024-12-14 | End: 2024-12-16 | Stop reason: HOSPADM

## 2024-12-14 RX ORDER — CALCIUM CARBONATE 500 MG/1
1000 TABLET, CHEWABLE ORAL DAILY PRN
Status: DISCONTINUED | OUTPATIENT
Start: 2024-12-14 | End: 2024-12-16 | Stop reason: HOSPADM

## 2024-12-14 RX ORDER — DIPHENHYDRAMINE HCL 25 MG
25 TABLET ORAL EVERY 6 HOURS PRN
Status: DISCONTINUED | OUTPATIENT
Start: 2024-12-14 | End: 2024-12-16 | Stop reason: HOSPADM

## 2024-12-14 RX ADMIN — OXYTOCIN 250 MILLI-UNITS/MIN: 10 INJECTION INTRAVENOUS at 17:00

## 2024-12-14 RX ADMIN — ONDANSETRON 4 MG: 2 INJECTION INTRAMUSCULAR; INTRAVENOUS at 01:33

## 2024-12-14 RX ADMIN — BENZOCAINE AND LEVOMENTHOL 1 APPLICATION: 200; 5 SPRAY TOPICAL at 19:19

## 2024-12-14 RX ADMIN — ROPIVACAINE HYDROCHLORIDE: 2 INJECTION, SOLUTION EPIDURAL; INFILTRATION at 15:10

## 2024-12-14 RX ADMIN — CALCIUM CARBONATE 1000 MG: 500 TABLET, CHEWABLE ORAL at 01:33

## 2024-12-14 RX ADMIN — ROPIVACAINE HYDROCHLORIDE: 2 INJECTION, SOLUTION EPIDURAL; INFILTRATION at 08:33

## 2024-12-14 RX ADMIN — FAMOTIDINE 20 MG: 10 INJECTION INTRAVENOUS at 03:06

## 2024-12-14 RX ADMIN — WITCH HAZEL 1 PAD: 500 SOLUTION RECTAL; TOPICAL at 19:20

## 2024-12-14 RX ADMIN — MAGNESIUM SULFATE IN WATER 2 G/HR: 20 INJECTION, SOLUTION INTRAVENOUS at 20:36

## 2024-12-14 RX ADMIN — LIDOCAINE HYDROCHLORIDE AND EPINEPHRINE 2 ML: 15; 5 INJECTION, SOLUTION EPIDURAL at 08:30

## 2024-12-14 RX ADMIN — ACETAMINOPHEN 650 MG: 325 TABLET, FILM COATED ORAL at 22:41

## 2024-12-14 RX ADMIN — LIDOCAINE HYDROCHLORIDE AND EPINEPHRINE 3 ML: 15; 5 INJECTION, SOLUTION EPIDURAL at 08:28

## 2024-12-14 RX ADMIN — METOCLOPRAMIDE HYDROCHLORIDE 10 MG: 5 INJECTION INTRAMUSCULAR; INTRAVENOUS at 03:21

## 2024-12-14 RX ADMIN — PENICILLIN G POTASSIUM 2.5 MILLION UNITS: 20000000 INJECTION, POWDER, FOR SOLUTION INTRAVENOUS at 07:52

## 2024-12-14 RX ADMIN — PENICILLIN G POTASSIUM 2.5 MILLION UNITS: 20000000 INJECTION, POWDER, FOR SOLUTION INTRAVENOUS at 11:41

## 2024-12-14 RX ADMIN — SODIUM CHLORIDE, SODIUM LACTATE, POTASSIUM CHLORIDE, AND CALCIUM CHLORIDE 50 ML/HR: .6; .31; .03; .02 INJECTION, SOLUTION INTRAVENOUS at 11:39

## 2024-12-14 RX ADMIN — IBUPROFEN 600 MG: 600 TABLET ORAL at 19:19

## 2024-12-14 RX ADMIN — ACETAMINOPHEN 975 MG: 325 TABLET, FILM COATED ORAL at 00:04

## 2024-12-14 RX ADMIN — PENICILLIN G POTASSIUM 2.5 MILLION UNITS: 20000000 INJECTION, POWDER, FOR SOLUTION INTRAVENOUS at 03:00

## 2024-12-14 RX ADMIN — MAGNESIUM SULFATE IN WATER 2 G/HR: 20 INJECTION, SOLUTION INTRAVENOUS at 01:21

## 2024-12-14 RX ADMIN — MAGNESIUM SULFATE IN WATER 2 G/HR: 20 INJECTION, SOLUTION INTRAVENOUS at 11:39

## 2024-12-14 RX ADMIN — ONDANSETRON 4 MG: 2 INJECTION INTRAMUSCULAR; INTRAVENOUS at 11:48

## 2024-12-14 RX ADMIN — HYDROCORTISONE 1 APPLICATION: 1 CREAM TOPICAL at 19:20

## 2024-12-14 NOTE — OB LABOR/OXYTOCIN SAFETY PROGRESS
Oxytocin Safety Progress Check Note - Elda Flores 24 y.o. female MRN: [id number]    Unit/Bed#: -01 Encounter: [id number]    Dose (iker-units/min) Oxytocin: 8 iker-units/min  Contraction Frequency (minutes): 0  Contraction Intensity: Mild/Moderate  Uterine Activity Characteristics: Irritability  Cervical Dilation: 4-5        Cervical Effacement: 70  Fetal Station: -2  Baseline Rate (FHR): 110 bpm  Fetal Heart Rate (FHT): 105 BPM  FHR Category: I               Vital Signs:   Vitals:    12/14/24 0130   BP: 128/85   Pulse: 78   Resp:    Temp:    SpO2:        Notes/comments:   Patient is feeling some nausea and some GERD.  Treated with Zofran and Tums and Pepcid.  SVE unchanged as above.  Discussed amniotomy again.  Offered patient epidural first versus starting with AROM and seeing how she does with pain.  Patient would like time to think.  Continue Pitocin titration.  FHT category 1.  Fetal baseline has changed to 110s with moderate variability and spontaneous accelerations.  Maternal pulse in the 70s.    Leydi Martínez MD 12/14/2024 1:41 AM

## 2024-12-14 NOTE — OB LABOR/OXYTOCIN SAFETY PROGRESS
Oxytocin Safety Progress Check Note - Elda Flores 24 y.o. female MRN: [id number]    Unit/Bed#: -01 Encounter: [id number]    Dose (iker-units/min) Oxytocin: 22 iker-units/min  Contraction Frequency (minutes): 1.5-3  Contraction Intensity: Mild/Moderate  Uterine Activity Characteristics: Regular  Cervical Dilation: 6-7        Cervical Effacement: 80  Fetal Station: -2  Baseline Rate (FHR): 135 bpm  Fetal Heart Rate (FHT): 131 BPM  FHR Category: I               Vital Signs:   Vitals:    12/14/24 1322   BP: 114/78   Pulse: 87   Resp:    Temp:    SpO2:        Notes/comments:   Pt progressing into labor, SVE 6.5/80/-2 FHT cat I. Discussed about the labor course. Pt is really calm.     Lobo Portillo MD 12/14/2024 1:42 PM

## 2024-12-14 NOTE — ANESTHESIA PROCEDURE NOTES
Epidural Block    Patient location during procedure: OB/L&D  Start time: 12/14/2024 8:26 AM  Reason for block: procedure for pain  Staffing  Performed by: Rojelio Farias CRNA  Authorized by: Mona Muniz MD    Preanesthetic Checklist  Completed: patient identified, IV checked, site marked, risks and benefits discussed, surgical consent, monitors and equipment checked, pre-op evaluation and timeout performed  Epidural  Patient position: sitting  Prep: ChloraPrep  Sedation Level: no sedation  Patient monitoring: frequent blood pressure checks, continuous pulse oximetry and heart rate  Approach: midline  Location: lumbar,  Injection technique: DAVID saline  Needle  Needle type: Tuohy   Needle gauge: 18 G  Needle insertion depth: 7.5 cm  Catheter type: multi-orifice  Catheter size: 20 G  Catheter at skin depth: 13.5 cm  Catheter securement method: stabilization device and clear occlusive dressing  Test dose: negative  Assessment  Sensory level: T10  Number of attempts: 1negative aspiration for CSF, negative aspiration for heme and no paresthesia on injection  patient tolerated the procedure well with no immediate complications  Additional Notes  DAVID @ 7.5cm; easy catheter thread; secured @ 13.5 cm

## 2024-12-14 NOTE — OB LABOR/OXYTOCIN SAFETY PROGRESS
Oxytocin Safety Progress Check Note - Elda Flores 24 y.o. female MRN: [id number]    Unit/Bed#: -01 Encounter: [id number]    Dose (iker-units/min) Oxytocin: 20 iker-units/min  Contraction Frequency (minutes): 1-6  Contraction Intensity: Mild/Moderate  Uterine Activity Characteristics: Irregular  Cervical Dilation: 5-6        Cervical Effacement: 70  Fetal Station: -2  Baseline Rate (FHR): 115 bpm  Fetal Heart Rate (FHT): 135 BPM  FHR Category: I               Vital Signs:   Vitals:    12/14/24 0745   BP: 126/79   Pulse: 72   Resp:    Temp:    SpO2:        Notes/comments:   Pt comfortable with the contractions. AROM performed clear. Pt is considering epidural.       Lobo Portillo MD 12/14/2024 8:06 AM

## 2024-12-14 NOTE — ANESTHESIA POSTPROCEDURE EVALUATION
As of 7/12/22 patient has used $628 of the maximum of $600 a year in waived co pays for specific medications and pharmacy-related supplies through the [address] [facility] for the DM Program.    Patient currently enrolled in the DM Program and has used all of the maximum of $600 a year in waived co pays for specific medications and pharmacy-related supplies through the [address] [employer] [facility]. Courtesy call placed to patient to advise them of the above information. Patient unavailable at the time of call. Message left on TAD: Maximum waived co pays for the DM Program has been met and they will begin to be charged their co-payments once again. I left our number: [phone], option #3 if patient has any questions/concerns.       Suzy Braxton, [address]   Phone: [phone], option #3      For Pharmacy Admin Tracking Only    CPA in place:  No  Time Spent (min): 5 Post-Op Assessment Note    CV Status:  Stable  Pain Score: 0    Pain management: adequate      Post-op block assessment: adhesive bandage applied, catheter intact, site cleaned and no complications   Mental Status:  Alert and awake   Hydration Status:  Euvolemic   PONV Controlled:  Controlled   Airway Patency:  Patent     Post Op Vitals Reviewed: Yes    No anethesia notable event occurred.    Staff: CRNA           Last Filed PACU Vitals:  Vitals Value Taken Time   Temp     Pulse 90 12/14/24 1731   /95 12/14/24 1731   Resp     SpO2     Vitals shown include unfiled device data.    Modified Adrian:  No data recorded

## 2024-12-14 NOTE — L&D DELIVERY NOTE
OBGYN Vaginal Delivery Summary  Elda Flores 24 y.o. female MRN: [id number]  Unit/Bed#: -01 Encounter: [id number]    Predelivery Diagnosis:  1. Pregnancy at 36w6d   2. Preeclampsia with severe features  3. Anxiety    Postdelivery Diagnosis:  1. Same as above  2. Delivery of      Procedure: spontaneous vaginal delivery, repair of 1st degree laceration(s)    Attending: Dr. Conklin    Assistant: Dr. Lobo Narayanan    Anesthesia: Epidural    QBL: 108 mL  Admission H g/dL  Admission platelets: 278 thousands/uL    Complications: none apparent    Specimens: cord blood, arterial and venous cord blood gases, placenta to pathology    Findings:   1. Viable male at 1605, with APGARS of 7 and 9 at 1 and 5 minutes respectively. Weight pending at time of dictation.  2. Spontaneous delivery of intact placenta at 1610. Central insertion three vessel umbilical cord  3. 1st degree laceration repaired with 3-0 Vicryl rapide  4. Blood gases:  Umbilical Cord Venous Blood Gas:  Results from last 7 days   Lab Units 24  1611   PH COV  7.268   PCO2 COV mm HG 46.5*   HCO3 COV mmol/L 20.8   BASE EXC COV mmol/L -6.3*   O2 CT CD VB mL/dL 13.2   O2 HGB, VENOUS CORD % 55.7     Umbilical Cord Arterial Blood Gas:  Results from last 7 days   Lab Units 24  1611   PH COA  7.232   PCO2 COA  53.5   PO2 COA mm HG 18.0   HCO3 COA mmol/L 22.0   BASE EXC COA mmol/L -6.2*   O2 CONTENT CORD ART ml/dl 7.9   O2 HGB, ARTERIAL CORD % 33.6       Disposition:  Patient tolerated the procedure well and was recovering in labor and delivery room.    Brief history and labor course:  Elda Flores is a 24 y.o.  at 36wk6d. She presented to labor and delivery and was  admitted for preeclampsia with severe features.  She presented with a nonsevere range blood pressure and in triage she had a severe range pressure that was nonsustained.  Despite offering pain medications her headache persisted and the decision was made to admit  her for induction for preeclampsia with severe features. Mag was started and induction. She received cytotec, ramirez balloon , pitocin, an epidural was placed. She progressed to complete cervical dilation and began pushing.     Description of procedure  After pushing for 45 minutes, the patient delivered a viable male  at 1605 on 2024, weight pending at time of dictation, apgars of 7 (1 min) and 9 (5 min). The fetal vertex delivered spontaneously. Baby restituted  to LOT. There was  no nuchal cord. The anterior (right) shoulder delivered atraumatically with maternal expulsive forces and the assistance of gentle downward traction. The posterior shoulder delivered with maternal expulsive forces and the assistance of gentle upward traction. The remainder of the fetus delivered spontaneously.     Upon delivery the infant was placed on the mother's abdomen and delayed cord clamping was performed. The umbilical cord was then doubly clamped and cut. The infant was noted to cry spontaneously and was moving all extremities appropriately. There was no evidence for injury. Awaiting nurse resuscitators evaluated the . Arterial and venous cord blood gases and cord blood were collected for analysis and were promptly sent to the lab. In the immediate post-partum, IV pitocin was administered, and the uterus was noted to contract down well with massage and pitocin. The placenta delivered spontaneously at 1610 and was noted to be intact and had a centrally inserted 3 vessel cord. The placenta was sent to pathology.    The vagina, cervix, perineum, and rectum were inspected. 1st laceration(s) were noted. Repair was completed with 3-0 Vicryl rapide.    At the conclusion of the procedure, all needle, sponge, and instrument counts were noted to be correct. Patient tolerated the procedure well and was allowed to recover in labor and delivery room with family and  before being transferred to the post-partum floor.      Dr. Story was present and participated in all key portions of the case.    Lobo Portillo MD  12/14/2024  4:47 PM

## 2024-12-14 NOTE — OB LABOR/OXYTOCIN SAFETY PROGRESS
Oxytocin Safety Progress Check Note - Elda Flores 24 y.o. female MRN: [id number]    Unit/Bed#: -01 Encounter: [id number]    Dose (iker-units/min) Oxytocin: 22 iker-units/min  Contraction Frequency (minutes): 1-4  Contraction Intensity: Mild/Moderate  Uterine Activity Characteristics: Irregular  Cervical Dilation: 5-6        Cervical Effacement: 70  Fetal Station: -2  Baseline Rate (FHR): 115 bpm  Fetal Heart Rate (FHT): 112 BPM  FHR Category: I               Vital Signs:   Vitals:    12/14/24 1000   BP: 136/89   Pulse: 78   Resp: 18   Temp: 98.3 °F (36.8 °C)   SpO2: 99%       Notes/comments:   Pt with epidural placed getting comfortable with the contractions, sve unchanged    Lobo Portillo MD 12/14/2024 10:22 AM

## 2024-12-14 NOTE — OB LABOR/OXYTOCIN SAFETY PROGRESS
Labor Progress Note - Elda Flores 24 y.o. female MRN: [id number]    Unit/Bed#: -01 Encounter: [id number]       Contraction Frequency (minutes): 1-2.5  Contraction Intensity: Mild/Moderate  Uterine Activity Characteristics: Irregular  Cervical Dilation: 4-5        Cervical Effacement: 70  Fetal Station: -2  Baseline Rate (FHR): 130 bpm  Fetal Heart Rate (FHT): 142 BPM  FHR Category: I               Vital Signs:   Vitals:    12/13/24 2117   BP: 149/95   Pulse: 83   Resp:    Temp:    SpO2:        Notes/comments:   Patient is feeling well at this time.  She is feeling regular mild contractions.  Notified by nursing that fetal heart tones were in variable locations.  Transabdominal ultrasound confirmed that fetus is still vertex.  FHT is category 1.  Parry balloon expelled with gentle traction.  Plan to start Pitocin at 2204, 4 hours after Cytotec.  Offered patient amniotomy as her baby is well engaged. Discussed AROM prior to or after epidural.  She would like time to think about it.    Leydi Martínez MD 12/13/2024 9:22 PM

## 2024-12-14 NOTE — PLAN OF CARE
Problem: BIRTH - VAGINAL/ SECTION  Goal: Fetal and maternal status remain reassuring during the birth process  Description: INTERVENTIONS:  - Monitor vital signs  - Monitor fetal heart rate  - Monitor uterine activity  - Monitor labor progression (vaginal delivery)  - DVT prophylaxis  - Antibiotic prophylaxis  2024 by Sofya Khan  Outcome: Progressing  2024 by Sofya Khan  Outcome: Progressing  2024 by Sofya Khan  Outcome: Progressing  Goal: Emotionally satisfying birthing experience for mother/fetus  Description: Interventions:  - Assess, plan, implement and evaluate the nursing care given to the patient in labor  - Advocate the philosophy that each childbirth experience is a unique experience and support the family's chosen level of involvement and control during the labor process   - Actively participate in both the patient's and family's teaching of the birth process  - Consider cultural, Jainism and age-specific factors and plan care for the patient in labor  2024 by Sofya Khan  Outcome: Progressing  2024 by Sofya Khan  Outcome: Progressing  2024 by Sofya Khan  Outcome: Progressing     Problem: PAIN - ADULT  Goal: Verbalizes/displays adequate comfort level or baseline comfort level  Description: Interventions:  - Encourage patient to monitor pain and request assistance  - Assess pain using appropriate pain scale  - Administer analgesics based on type and severity of pain and evaluate response  - Implement non-pharmacological measures as appropriate and evaluate response  - Consider cultural and social influences on pain and pain management  - Notify physician/advanced practitioner if interventions unsuccessful or patient reports new pain  2024 by Sofya Khan  Outcome: Progressing  2024 by Sofya Khan  Outcome:  Progressing  12/14/2024 0722 by Sofya Khan  Outcome: Progressing     Problem: Knowledge Deficit  Goal: Patient/family/caregiver demonstrates understanding of disease process, treatment plan, medications, and discharge instructions  Description: Complete learning assessment and assess knowledge base.  Interventions:  - Provide teaching at level of understanding  - Provide teaching via preferred learning methods  12/14/2024 0722 by Sofya Khan  Outcome: Progressing  12/14/2024 0722 by Sofya Khan  Outcome: Progressing  12/14/2024 0722 by Sofya Khan  Outcome: Progressing     Problem: DISCHARGE PLANNING  Goal: Discharge to home or other facility with appropriate resources  Description: INTERVENTIONS:  - Identify barriers to discharge w/patient and caregiver  - Arrange for needed discharge resources and transportation as appropriate  - Identify discharge learning needs (meds, wound care, etc.)  - Arrange for interpretive services to assist at discharge as needed  - Refer to Case Management Department for coordinating discharge planning if the patient needs post-hospital services based on physician/advanced practitioner order or complex needs related to functional status, cognitive ability, or social support system  12/14/2024 0722 by Sofya Khan  Outcome: Progressing  12/14/2024 0722 by Sofya Khan  Outcome: Progressing  12/14/2024 0722 by Sofya Khan  Outcome: Progressing     Problem: INFECTION - ADULT  Goal: Absence or prevention of progression during hospitalization  Description: INTERVENTIONS:  - Assess and monitor for signs and symptoms of infection  - Monitor lab/diagnostic results  - Monitor all insertion sites, i.e. indwelling lines, tubes, and drains  - Monitor endotracheal if appropriate and nasal secretions for changes in amount and color  - [place] appropriate cooling/warming therapies per order  - Administer medications as  ordered  - Instruct and encourage patient and family to use good hand hygiene technique  - Identify and instruct in appropriate isolation precautions for identified infection/condition  12/14/2024 0722 by Sofya Khan  Outcome: Progressing  12/14/2024 0722 by Sofya Khan  Outcome: Progressing  12/14/2024 0722 by Sofya Khan  Outcome: Progressing  Goal: Absence of fever/infection during neutropenic period  Description: INTERVENTIONS:  - Monitor WBC    12/14/2024 0722 by Sofya Khan  Outcome: Progressing  12/14/2024 0722 by Sofya Khan  Outcome: Progressing  12/14/2024 0722 by Sofya Khan  Outcome: Progressing     Problem: SAFETY ADULT  Goal: Patient will remain free of falls  Description: INTERVENTIONS:  - Educate patient/family on patient safety including physical limitations  - Instruct patient to call for assistance with activity   - Consult OT/PT to assist with strengthening/mobility   - Keep Call bell within reach  - Keep bed low and locked with side rails adjusted as appropriate  - Keep care items and personal belongings within reach  - Initiate and maintain comfort rounds  - Make Fall Risk Sign visible to staff  - Apply yellow socks and bracelet for high fall risk patients  - Consider moving patient to room near nurses station  12/14/2024 0722 by Sofya Khan  Outcome: Progressing  12/14/2024 0722 by Sofya Khan  Outcome: Progressing  12/14/2024 0722 by Sofya Khan  Outcome: Progressing  Goal: Maintain or return to baseline ADL function  Description: INTERVENTIONS:  -  Assess patient's ability to carry out ADLs; assess patient's baseline for ADL function and identify physical deficits which impact ability to perform ADLs (bathing, care of mouth/teeth, toileting, grooming, dressing, etc.)  - Assess/evaluate cause of self-care deficits   - Assess range of motion  - Assess patient's mobility; develop plan if  impaired  - Assess patient's need for assistive devices and provide as appropriate  - Encourage maximum independence but intervene and supervise when necessary  - Involve family in performance of ADLs  - Assess for home care needs following discharge   - Consider OT consult to assist with ADL evaluation and planning for discharge  - Provide patient education as appropriate  12/14/2024 0722 by Sofya Khan  Outcome: Progressing  12/14/2024 0722 by Sofya Khan  Outcome: Progressing  12/14/2024 0722 by Sofya Khan  Outcome: Progressing  Goal: Maintains/Returns to pre admission functional level  Description: INTERVENTIONS:  - Perform AM-PAC 6 Click Basic Mobility/ Daily Activity assessment daily.  - Set and communicate daily mobility goal to care team and patient/family/caregiver.   - Collaborate with rehabilitation services on mobility goals if consulted  - Out of bed for toileting  - Record patient progress and toleration of activity level   12/14/2024 0722 by Sofya Khan  Outcome: Progressing  12/14/2024 0722 by Sofya Khan  Outcome: Progressing  12/14/2024 0722 by Sofya Khan  Outcome: Progressing

## 2024-12-14 NOTE — OB LABOR/OXYTOCIN SAFETY PROGRESS
Oxytocin Safety Progress Check Note - Elda Flores 24 y.o. female MRN: [id number]    Unit/Bed#: -01 Encounter: [id number]    Dose (iker-units/min) Oxytocin: 2 iker-units/min  Contraction Frequency (minutes): 1.5-2.5  Contraction Intensity: Mild/Moderate  Uterine Activity Characteristics: Irregular  Cervical Dilation: 4-5        Cervical Effacement: 70  Fetal Station: -2  Baseline Rate (FHR): 125 bpm  Fetal Heart Rate (FHT): 124 BPM  FHR Category: I               Vital Signs:   Vitals:    12/13/24 2330   BP: 149/98   Pulse: 83   Resp:    Temp:    SpO2:        Notes/comments:   FHT category 1.  She is gianna every 1 to 4 minutes.  Pitocin start delayed per patient request.  Pitocin started at 2300.  Continue Pitocin titration.  SVE deferred at this time.    Leydi Martínez MD 12/13/2024 11:43 PM

## 2024-12-14 NOTE — DISCHARGE SUMMARY
Discharge Summary - OB/GYN   Name: Elda Nino y.o. female I MRN: [id number]  Unit/Bed#: -01 I Date of Admission: 2024   Date of Service: 2024 I Hospital Day: 1    Obstetrics Discharge Summary  Elda Flores 24 y.o. female MRN: [id number]  Unit/Bed#: -01 Encounter: [id number]    Admission Date: 2024     Discharge Date: 24    Admitting Attending: Cassie  Delivery Attending: Jez  Discharging Attending: Cassie    Admitting Diagnoses:   1. Pregnancy at 36w6d   2. Preeclampsia with severe features  3. Anxiety    Discharge Diagnoses:   1. Same as above  2. Delivery of      Delivery  Route of Delivery: Vaginal, Spontaneous    Anesthesia: Epidural,   QBL: 171 ml    Delivery: Vaginal, Spontaneous at 2024 4:05 PM  Laceration: Perineal: 1° Repaired? Yes    Baby's Weight:  ;      Apgar scores: 7  and 9  at 1 and 5 minutes, respectively      Hospital course: Elda Flores is now a 24 y.o. GIP0 who was initially admitted at 36w5d for  preeclampsia with severe features.  She presented with a nonsevere range blood pressure and in triage she had a severe range pressure that was nonsustained.  Despite offering pain medications her headache persisted and the decision was made to admit her for induction for preeclampsia with severe features. Mag was started and induction. She received cytotec, ramirez balloon , pitocin, an epidural was placed. She progressed to complete cervical dilation and began pushing.     Her post-delivery course was uncomplicated. Her postpartum pain was well controlled with oral analgesics. Maternal blood type is O pos so RhoGAM was not indicated.    On day of discharge, she was ambulating and able to reasonably perform all ADLs. She was voiding and had appropriate bowel function. Pain was well controlled. She was discharged home on postpartum day #2 without complications. Patient was instructed to follow up with her OBGYN as an  outpatient and was given appropriate warnings to call provider if she develops signs of infection or uncontrolled pain.    Complications: none apparent    Condition at discharge: good     Provisions for Follow-Up Care:  Please see after visit summary for information related to follow-up care and any pertinent home health orders.      Disposition: Home    Planned Readmission: No    Discharge Medications:   Please see AVS for a complete list of discharge medications.    Discharge instructions :   -Do not place anything (no partner, tampons or douche) in your vagina for 6 weeks  -You may walk for exercise for the first 6 weeks then gradually return to your usual activities   -Please do not drive for 1 week if you have no stitches and for 2 weeks if you have stitches    -You may take baths or shower per your preference   -Please examine your breasts in the mirror daily and call your doctor for redness or tenderness or increased warmth    -Please call your doctor's office if temperature > 100.4*F or 38* C, worsening pain or a foul discharge.

## 2024-12-14 NOTE — ANESTHESIA PREPROCEDURE EVALUATION
Procedure:  LABOR ANALGESIA    Relevant Problems   CARDIO   (+) Preeclampsia, severe      GYN   (+) 36 weeks gestation of pregnancy      NEURO/PSYCH   (+) Generalized anxiety disorder        Lab Results   Component Value Date    WBC 14.68 (H) 12/14/2024    HGB 11.0 (L) 12/14/2024    HCT 33.3 (L) 12/14/2024    MCV 89 12/14/2024     12/14/2024     Lab Results   Component Value Date    SODIUM 136 12/14/2024    K 3.8 12/14/2024     12/14/2024    CO2 20 (L) 12/14/2024    BUN 10 12/14/2024    CREATININE 0.77 12/14/2024    GLUC 86 12/14/2024    CALCIUM 6.8 (L) 12/14/2024         Physical Exam    Airway    Mallampati score: II  TM Distance: >3 FB  Neck ROM: full     Dental   No notable dental hx     Cardiovascular  Rhythm: regular, Rate: normal, Cardiovascular exam normal    Pulmonary  Pulmonary exam normal Breath sounds clear to auscultation    Other Findings  post-pubertal.      Anesthesia Plan  ASA Score- 2     Anesthesia Type- epidural with ASA Monitors.         Additional Monitors:     Airway Plan:            Plan Factors-Exercise tolerance (METS): >4 METS.    Chart reviewed.   Existing labs reviewed. Patient summary reviewed.    Patient is not a current smoker. Patient not instructed to abstain from smoking on day of procedure. Patient did not smoke on day of surgery.            Induction-     Postoperative Plan-     Perioperative Resuscitation Plan - Level 1 - Full Code.       Informed Consent- Anesthetic plan and risks discussed with patient.  I personally reviewed this patient with the CRNA. Discussed and agreed on the Anesthesia Plan with the CRNA..

## 2024-12-14 NOTE — OB LABOR/OXYTOCIN SAFETY PROGRESS
Oxytocin Safety Progress Check Note - Elda Flores 24 y.o. female MRN: [id number]    Unit/Bed#: -01 Encounter: [id number]    Dose (iker-units/min) Oxytocin: 12 iker-units/min  Contraction Frequency (minutes): 2-3  Contraction Intensity: Mild/Moderate  Uterine Activity Characteristics: Irregular  Cervical Dilation: 4-5        Cervical Effacement: 70  Fetal Station: -2  Baseline Rate (FHR): 110 bpm  Fetal Heart Rate (FHT): 113 BPM  FHR Category: I               Vital Signs:   Vitals:    12/14/24 0407   BP:    Pulse: 69   Resp:    Temp:    SpO2:        Notes/comments:   FHT cat I. Patient was having a lot of nausea. Medications effective. She is currently resting comfortably. Sve deferred at this time. Continue pitocin titration.       Leydi Martínez MD 12/14/2024 4:13 AM

## 2024-12-14 NOTE — PLAN OF CARE
Problem: BIRTH - VAGINAL/ SECTION  Goal: Fetal and maternal status remain reassuring during the birth process  Description: INTERVENTIONS:  - Monitor vital signs  - Monitor fetal heart rate  - Monitor uterine activity  - Monitor labor progression (vaginal delivery)  - DVT prophylaxis  - Antibiotic prophylaxis  Outcome: Progressing  Goal: Emotionally satisfying birthing experience for mother/fetus  Description: Interventions:  - Assess, plan, implement and evaluate the nursing care given to the patient in labor  - Advocate the philosophy that each childbirth experience is a unique experience and support the family's chosen level of involvement and control during the labor process   - Actively participate in both the patient's and family's teaching of the birth process  - Consider cultural, Yazidism and age-specific factors and plan care for the patient in labor  Outcome: Progressing     Problem: PAIN - ADULT  Goal: Verbalizes/displays adequate comfort level or baseline comfort level  Description: Interventions:  - Encourage patient to monitor pain and request assistance  - Assess pain using appropriate pain scale  - Administer analgesics based on type and severity of pain and evaluate response  - Implement non-pharmacological measures as appropriate and evaluate response  - Consider cultural and social influences on pain and pain management  - Notify physician/advanced practitioner if interventions unsuccessful or patient reports new pain  Outcome: Progressing     Problem: Knowledge Deficit  Goal: Patient/family/caregiver demonstrates understanding of disease process, treatment plan, medications, and discharge instructions  Description: Complete learning assessment and assess knowledge base.  Interventions:  - Provide teaching at level of understanding  - Provide teaching via preferred learning methods  Outcome: Progressing     Problem: DISCHARGE PLANNING  Goal: Discharge to home or other facility with  appropriate resources  Description: INTERVENTIONS:  - Identify barriers to discharge w/patient and caregiver  - Arrange for needed discharge resources and transportation as appropriate  - Identify discharge learning needs (meds, wound care, etc.)  - Arrange for interpretive services to assist at discharge as needed  - Refer to Case Management Department for coordinating discharge planning if the patient needs post-hospital services based on physician/advanced practitioner order or complex needs related to functional status, cognitive ability, or social support system  Outcome: Progressing     Problem: INFECTION - ADULT  Goal: Absence or prevention of progression during hospitalization  Description: INTERVENTIONS:  - Assess and monitor for signs and symptoms of infection  - Monitor lab/diagnostic results  - Monitor all insertion sites, i.e. indwelling lines, tubes, and drains  - Monitor endotracheal if appropriate and nasal secretions for changes in amount and color  - [place] appropriate cooling/warming therapies per order  - Administer medications as ordered  - Instruct and encourage patient and family to use good hand hygiene technique  - Identify and instruct in appropriate isolation precautions for identified infection/condition  Outcome: Progressing  Goal: Absence of fever/infection during neutropenic period  Description: INTERVENTIONS:  - Monitor WBC    Outcome: Progressing     Problem: SAFETY ADULT  Goal: Patient will remain free of falls  Description: INTERVENTIONS:  - Educate patient/family on patient safety including physical limitations  - Instruct patient to call for assistance with activity   - Consult OT/PT to assist with strengthening/mobility   - Keep Call bell within reach  - Keep bed low and locked with side rails adjusted as appropriate  - Keep care items and personal belongings within reach  - Initiate and maintain comfort rounds  - Make Fall Risk Sign visible to staff  - Offer Toileting every   Hours, in advance of need  - Initiate/Maintain alarm  - Obtain necessary fall risk management equipment:   - Apply yellow socks and bracelet for high fall risk patients  - Consider moving patient to room near nurses station  Outcome: Progressing  Goal: Maintain or return to baseline ADL function  Description: INTERVENTIONS:  -  Assess patient's ability to carry out ADLs; assess patient's baseline for ADL function and identify physical deficits which impact ability to perform ADLs (bathing, care of mouth/teeth, toileting, grooming, dressing, etc.)  - Assess/evaluate cause of self-care deficits   - Assess range of motion  - Assess patient's mobility; develop plan if impaired  - Assess patient's need for assistive devices and provide as appropriate  - Encourage maximum independence but intervene and supervise when necessary  - Involve family in performance of ADLs  - Assess for home care needs following discharge   - Consider OT consult to assist with ADL evaluation and planning for discharge  - Provide patient education as appropriate  Outcome: Progressing  Goal: Maintains/Returns to pre admission functional level  Description: INTERVENTIONS:  - Perform AM-PAC 6 Click Basic Mobility/ Daily Activity assessment daily.  - Set and communicate daily mobility goal to care team and patient/family/caregiver.   - Collaborate with rehabilitation services on mobility goals if consulted  - Perform Range of Motion  times a day.  - Reposition patient every hours.  - Dangle patient  times a day  - Stand patient  times a day  - Ambulate patient  times a day  - Out of bed to chair  times a day   - Out of bed for meals  times a day  - Out of bed for toileting  - Record patient progress and toleration of activity level   Outcome: Progressing

## 2024-12-15 ENCOUNTER — RESULTS FOLLOW-UP (OUTPATIENT)
Dept: OBGYN CLINIC | Facility: CLINIC | Age: 24
End: 2024-12-15

## 2024-12-15 LAB
ALBUMIN SERPL BCG-MCNC: 2.7 G/DL (ref 3.5–5)
ALBUMIN SERPL BCG-MCNC: 2.7 G/DL (ref 3.5–5)
ALP SERPL-CCNC: 203 U/L (ref 34–104)
ALP SERPL-CCNC: 218 U/L (ref 34–104)
ALT SERPL W P-5'-P-CCNC: 69 U/L (ref 7–52)
ALT SERPL W P-5'-P-CCNC: 71 U/L (ref 7–52)
ANION GAP SERPL CALCULATED.3IONS-SCNC: 7 MMOL/L (ref 4–13)
ANION GAP SERPL CALCULATED.3IONS-SCNC: 8 MMOL/L (ref 4–13)
AST SERPL W P-5'-P-CCNC: 55 U/L (ref 13–39)
AST SERPL W P-5'-P-CCNC: 61 U/L (ref 13–39)
BILIRUB SERPL-MCNC: 0.29 MG/DL (ref 0.2–1)
BILIRUB SERPL-MCNC: 0.35 MG/DL (ref 0.2–1)
BUN SERPL-MCNC: 9 MG/DL (ref 5–25)
BUN SERPL-MCNC: 9 MG/DL (ref 5–25)
CALCIUM ALBUM COR SERPL-MCNC: 7.3 MG/DL (ref 8.3–10.1)
CALCIUM ALBUM COR SERPL-MCNC: 7.3 MG/DL (ref 8.3–10.1)
CALCIUM SERPL-MCNC: 6.3 MG/DL (ref 8.4–10.2)
CALCIUM SERPL-MCNC: 6.3 MG/DL (ref 8.4–10.2)
CHLORIDE SERPL-SCNC: 103 MMOL/L (ref 96–108)
CHLORIDE SERPL-SCNC: 103 MMOL/L (ref 96–108)
CO2 SERPL-SCNC: 21 MMOL/L (ref 21–32)
CO2 SERPL-SCNC: 21 MMOL/L (ref 21–32)
CREAT SERPL-MCNC: 0.86 MG/DL (ref 0.6–1.3)
CREAT SERPL-MCNC: 0.9 MG/DL (ref 0.6–1.3)
ERYTHROCYTE [DISTWIDTH] IN BLOOD BY AUTOMATED COUNT: 12.9 % (ref 11.6–15.1)
ERYTHROCYTE [DISTWIDTH] IN BLOOD BY AUTOMATED COUNT: 13.1 % (ref 11.6–15.1)
GFR SERPL CREATININE-BSD FRML MDRD: 89 ML/MIN/1.73SQ M
GFR SERPL CREATININE-BSD FRML MDRD: 94 ML/MIN/1.73SQ M
GLUCOSE SERPL-MCNC: 109 MG/DL (ref 65–140)
GLUCOSE SERPL-MCNC: 95 MG/DL (ref 65–140)
GP B STREP DNA SPEC QL NAA+PROBE: NEGATIVE
HCT VFR BLD AUTO: 30.4 % (ref 34.8–46.1)
HCT VFR BLD AUTO: 31.1 % (ref 34.8–46.1)
HGB BLD-MCNC: 10.2 G/DL (ref 11.5–15.4)
HGB BLD-MCNC: 10.2 G/DL (ref 11.5–15.4)
MAGNESIUM SERPL-MCNC: 7.2 MG/DL (ref 1.9–2.7)
MAGNESIUM SERPL-MCNC: 7.2 MG/DL (ref 1.9–2.7)
MCH RBC QN AUTO: 29.2 PG (ref 26.8–34.3)
MCH RBC QN AUTO: 29.5 PG (ref 26.8–34.3)
MCHC RBC AUTO-ENTMCNC: 32.8 G/DL (ref 31.4–37.4)
MCHC RBC AUTO-ENTMCNC: 33.6 G/DL (ref 31.4–37.4)
MCV RBC AUTO: 88 FL (ref 82–98)
MCV RBC AUTO: 89 FL (ref 82–98)
PLATELET # BLD AUTO: 235 THOUSANDS/UL (ref 149–390)
PLATELET # BLD AUTO: 240 THOUSANDS/UL (ref 149–390)
PMV BLD AUTO: 11.7 FL (ref 8.9–12.7)
PMV BLD AUTO: 11.8 FL (ref 8.9–12.7)
POTASSIUM SERPL-SCNC: 3.6 MMOL/L (ref 3.5–5.3)
POTASSIUM SERPL-SCNC: 3.8 MMOL/L (ref 3.5–5.3)
PROT SERPL-MCNC: 5.3 G/DL (ref 6.4–8.4)
PROT SERPL-MCNC: 5.5 G/DL (ref 6.4–8.4)
RBC # BLD AUTO: 3.46 MILLION/UL (ref 3.81–5.12)
RBC # BLD AUTO: 3.49 MILLION/UL (ref 3.81–5.12)
SODIUM SERPL-SCNC: 131 MMOL/L (ref 135–147)
SODIUM SERPL-SCNC: 132 MMOL/L (ref 135–147)
URATE SERPL-MCNC: 6.2 MG/DL (ref 2–7.5)
URATE SERPL-MCNC: 6.2 MG/DL (ref 2–7.5)
WBC # BLD AUTO: 15.18 THOUSAND/UL (ref 4.31–10.16)
WBC # BLD AUTO: 16.62 THOUSAND/UL (ref 4.31–10.16)

## 2024-12-15 PROCEDURE — 83735 ASSAY OF MAGNESIUM: CPT

## 2024-12-15 PROCEDURE — 99024 POSTOP FOLLOW-UP VISIT: CPT | Performed by: STUDENT IN AN ORGANIZED HEALTH CARE EDUCATION/TRAINING PROGRAM

## 2024-12-15 PROCEDURE — 84550 ASSAY OF BLOOD/URIC ACID: CPT

## 2024-12-15 PROCEDURE — 80053 COMPREHEN METABOLIC PANEL: CPT

## 2024-12-15 PROCEDURE — 85027 COMPLETE CBC AUTOMATED: CPT

## 2024-12-15 RX ORDER — NIFEDIPINE 30 MG/1
30 TABLET, EXTENDED RELEASE ORAL DAILY
Status: DISCONTINUED | OUTPATIENT
Start: 2024-12-15 | End: 2024-12-16 | Stop reason: HOSPADM

## 2024-12-15 RX ORDER — ECHINACEA PURPUREA EXTRACT 125 MG
1 TABLET ORAL
Status: DISCONTINUED | OUTPATIENT
Start: 2024-12-15 | End: 2024-12-16 | Stop reason: HOSPADM

## 2024-12-15 RX ADMIN — FAMOTIDINE 10 MG: 20 TABLET, FILM COATED ORAL at 08:26

## 2024-12-15 RX ADMIN — IBUPROFEN 600 MG: 600 TABLET ORAL at 00:25

## 2024-12-15 RX ADMIN — CALCIUM CARBONATE 1000 MG: 500 TABLET, CHEWABLE ORAL at 00:25

## 2024-12-15 RX ADMIN — NIFEDIPINE 30 MG: 30 TABLET, FILM COATED, EXTENDED RELEASE ORAL at 18:14

## 2024-12-15 RX ADMIN — DOCUSATE SODIUM 100 MG: 100 CAPSULE, LIQUID FILLED ORAL at 18:14

## 2024-12-15 RX ADMIN — SODIUM CHLORIDE, SODIUM LACTATE, POTASSIUM CHLORIDE, AND CALCIUM CHLORIDE 50 ML/HR: .6; .31; .03; .02 INJECTION, SOLUTION INTRAVENOUS at 06:48

## 2024-12-15 RX ADMIN — MAGNESIUM SULFATE IN WATER 2 G/HR: 20 INJECTION, SOLUTION INTRAVENOUS at 06:48

## 2024-12-15 RX ADMIN — FAMOTIDINE 10 MG: 20 TABLET, FILM COATED ORAL at 18:14

## 2024-12-15 RX ADMIN — DOCUSATE SODIUM 100 MG: 100 CAPSULE, LIQUID FILLED ORAL at 08:26

## 2024-12-15 RX ADMIN — Medication 1 TABLET: at 08:26

## 2024-12-15 RX ADMIN — SALINE NASAL SPRAY 1 SPRAY: 1.5 SOLUTION NASAL at 02:53

## 2024-12-15 NOTE — LACTATION NOTE
This note was copied from a baby's chart.  CONSULT - LACTATION  Baby Boy Flores (Ashley) 1 days male MRN: [id number]    [facility] NURSERY Room / Bed: (N)/(N) Encounter: [id number]    Maternal Information     MOTHER:  Elda Flores  Maternal Age: 24 y.o.  OB History: # 1 - Date: 24, Sex: Male, Weight: 2665 g (5 lb 14 oz), GA: 36w6d, Type: Vaginal, Spontaneous, Apgar1: 7, Apgar5: 9, Living: Living, Birth Comments: None   Previouse breast reduction surgery? No    Lactation history:   Has patient previously breast fed: No   How long had patient previously breast fed:     Previous breast feeding complications:     History reviewed. No pertinent surgical history.    Birth information:  YOB: 2025   Time of birth: 4:05 PM   Sex: male   Delivery type: Vaginal, Spontaneous   Birth Weight: 2665 g (5 lb 14 oz)   Percent of Weight Change: 0%     Gestational Age: 36w6d   [unfilled]    Assessment     Breast and nipple assessment:  wide, conical breasts    [place] Assessment: sleepy    Feeding assessment: latch difficulty (sleepy, falls asleep at breast)  LATCH:  Latch: Grasps breast, tongue down, lips flanged, rhythmic sucking   Audible Swallowing: Spontaneous and intermittent (24 hours old)   Type of Nipple: Everted (After stimulation)   Comfort (Breast/Nipple): Soft/non-tender   Hold (Positioning): Partial assist, teach one side, mother does other, staff holds   LATCH Score: 9          Feeding recommendations:   offer breast every 2-3 hours; provide Neosure as ordered via syringe or paced bottle feed; pump when supplementation is given  Met with Elda who is breastfeeding her baby boy. Baby boy is a late  infant on the glucose protocol. Baby boy's blood glucose has been low and baby was ordered Neosure supplementation. Parents have been syringe feeding. Discussed syringe feeding and paced bottle feeding. Encouraged mom to offer the breast first  before offering supplementation. Discussed the importance of pumping when supplementing. Brought in and set up multi user pump. Reviewed pumping settings, frequency, and length. Provided with smaller flange inserts; discussed optimal fit guidelines. Encouraged hand expression for 2 minutes each breast after double pumping. Encouraged ample skin to skin time with both parents to regulate temperature and blood sugar while enticing baby to eat. Provided with and reviewed the LPI breastfeeding handout, the Ready Set Baby booklet, and the Discharge Breastfeeding Booklet. Encouraged to call for lactation support as needed.    Kishor Arauz MA 12/15/2024 3:11 PM

## 2024-12-15 NOTE — PROGRESS NOTES
"Progress Note - OB/GYN   Name: Elda Flores 24 y.o. female I MRN: [id number]  Unit/Bed#: -01 I Date of Admission: 2024   Date of Service: 12/15/2024 I Hospital Day: 2     Assessment & Plan   (spontaneous vaginal delivery)  Continue routine post partum care  Encourage ambulation  Encourage breastfeeding  Contraception: Undecided  Anticipate discharge PPD 1 vs 2   Preeclampsia, severe  CBC, CMP wnl, UPC 0.6  BP monitoring  Mag started  1547  Labs 6/6h    Systolic (12hrs), Av , Min:118 , Max:166   Diastolic (12hrs), Av, Min:63, Max:107       Progress Note - OB/GYN   Elda Flores 24 y.o. female MRN: [id number]  Unit/Bed#: -01 Encounter: [id number]    Assessment:  Post partum Day #1 s/p , stable, baby in the room. Patient diagnosed with preeclampsia with severe features, magnesium until 1605.    Subjective/Objective   Chief Complaint:     Post delivery. Patient is doing well. Lochia WNL. Pain well controlled.     Subjective:     Pain: yes, cramping, improved with meds  Tolerating PO: yes  Voiding: yes  Flatus: yes  Ambulating: yes  Chest pain: no  Shortness of breath: no  Leg pain: no  Lochia: minimal    Objective:     Vitals: /90 (BP Location: Right arm)   Pulse 80   Temp 98 °F (36.7 °C) (Oral)   Resp 20   Ht 5' 4\" (1.626 m)   Wt 90.7 kg (200 lb)   LMP 2024 (Within Days)   SpO2 97%   Breastfeeding Yes   BMI 34.33 kg/m²     I/O          0701   0700  0701  12/15 0700    P.O. 240     I.V. (mL/kg) 2095.2 (23.1)     IV Piggyback 600     Total Intake(mL/kg) 2935.2 (32.4)     Urine (mL/kg/hr) 1500 1075 (0.5)    Blood  108    Total Output 1500 1183    Net +1435.2 -1183                  Lab Results   Component Value Date    WBC 20.07 (H) 2024    HGB 11.1 (L) 2024    HCT 32.5 (L) 2024    MCV 87 2024     2024       Physical Exam:     Gen: AAOx3, NAD  CV: no acute distress  Lungs: no acute distress  Abd: Soft, " non-tender, non-distended, no rebound or guarding  Uterine fundus firm and non-tender, 2 cm below the umbilicus.  Ext: Non tender, edema 2+/4+, face edema.    Lobo Narayanan MD  OB GYN PGY1  12/15/24  1:28 AM

## 2024-12-15 NOTE — PLAN OF CARE
Problem: BIRTH - VAGINAL/ SECTION  Goal: Fetal and maternal status remain reassuring during the birth process  Description: INTERVENTIONS:  - Monitor vital signs  - Monitor fetal heart rate  - Monitor uterine activity  - Monitor labor progression (vaginal delivery)  - DVT prophylaxis  - Antibiotic prophylaxis  Outcome: Completed  Goal: Emotionally satisfying birthing experience for mother/fetus  Description: Interventions:  - Assess, plan, implement and evaluate the nursing care given to the patient in labor  - Advocate the philosophy that each childbirth experience is a unique experience and support the family's chosen level of involvement and control during the labor process   - Actively participate in both the patient's and family's teaching of the birth process  - Consider cultural, Presybeterian and age-specific factors and plan care for the patient in labor  Outcome: Completed     Problem: POSTPARTUM  Goal: Experiences normal postpartum course  Description: INTERVENTIONS:  - Monitor maternal vital signs  - Assess uterine involution and lochia  Outcome: Progressing  Goal: Appropriate maternal -  bonding  Description: INTERVENTIONS:  - Identify family support  - Assess for appropriate maternal/infant bonding   -Encourage maternal/infant bonding opportunities  - Referral to  or  as needed  Outcome: Progressing  Goal: Establishment of infant feeding pattern  Description: INTERVENTIONS:  - Assess breast/bottle feeding  - Refer to lactation as needed  Outcome: Progressing  Goal: Incision(s), wounds(s) or drain site(s) healing without S/S of infection  Description: INTERVENTIONS  - Assess and document dressing, incision, wound bed, drain sites and surrounding tissue  - Provide patient and family education  - Perform skin care/dressing changes every   Outcome: Progressing     Problem: PAIN - ADULT  Goal: Verbalizes/displays adequate comfort level or baseline comfort  level  Description: Interventions:  - Encourage patient to monitor pain and request assistance  - Assess pain using appropriate pain scale  - Administer analgesics based on type and severity of pain and evaluate response  - Implement non-pharmacological measures as appropriate and evaluate response  - Consider cultural and social influences on pain and pain management  - Notify physician/advanced practitioner if interventions unsuccessful or patient reports new pain  Outcome: Progressing     Problem: INFECTION - ADULT  Goal: Absence or prevention of progression during hospitalization  Description: INTERVENTIONS:  - Assess and monitor for signs and symptoms of infection  - Monitor lab/diagnostic results  - Monitor all insertion sites, i.e. indwelling lines, tubes, and drains  - Monitor endotracheal if appropriate and nasal secretions for changes in amount and color  - [place] appropriate cooling/warming therapies per order  - Administer medications as ordered  - Instruct and encourage patient and family to use good hand hygiene technique  - Identify and instruct in appropriate isolation precautions for identified infection/condition  Outcome: Progressing  Goal: Absence of fever/infection during neutropenic period  Description: INTERVENTIONS:  - Monitor WBC    Outcome: Progressing     Problem: SAFETY ADULT  Goal: Patient will remain free of falls  Description: INTERVENTIONS:  - Educate patient/family on patient safety including physical limitations  - Instruct patient to call for assistance with activity   - Consult OT/PT to assist with strengthening/mobility   - Keep Call bell within reach  - Keep bed low and locked with side rails adjusted as appropriate  - Keep care items and personal belongings within reach  - Initiate and maintain comfort rounds  - Make Fall Risk Sign visible to staff  - Offer Toileting every  Hours, in advance of need  - Initiate/Maintain alarm  - Obtain necessary fall risk management  equipment:   - Apply yellow socks and bracelet for high fall risk patients  - Consider moving patient to room near nurses station  Outcome: Progressing  Goal: Maintain or return to baseline ADL function  Description: INTERVENTIONS:  -  Assess patient's ability to carry out ADLs; assess patient's baseline for ADL function and identify physical deficits which impact ability to perform ADLs (bathing, care of mouth/teeth, toileting, grooming, dressing, etc.)  - Assess/evaluate cause of self-care deficits   - Assess range of motion  - Assess patient's mobility; develop plan if impaired  - Assess patient's need for assistive devices and provide as appropriate  - Encourage maximum independence but intervene and supervise when necessary  - Involve family in performance of ADLs  - Assess for home care needs following discharge   - Consider OT consult to assist with ADL evaluation and planning for discharge  - Provide patient education as appropriate  Outcome: Progressing  Goal: Maintains/Returns to pre admission functional level  Description: INTERVENTIONS:  - Perform AM-PAC 6 Click Basic Mobility/ Daily Activity assessment daily.  - Set and communicate daily mobility goal to care team and patient/family/caregiver.   - Collaborate with rehabilitation services on mobility goals if consulted  - Perform Range of Motion  times a day.  - Reposition patient every  hours.  - Dangle patient  times a day  - Stand patient  times a day  - Ambulate patient  times a day  - Out of bed to chair  times a day   - Out of bed for meals  times a day  - Out of bed for toileting  - Record patient progress and toleration of activity level   Outcome: Progressing

## 2024-12-16 VITALS
HEIGHT: 64 IN | BODY MASS INDEX: 34.15 KG/M2 | HEART RATE: 92 BPM | OXYGEN SATURATION: 99 % | TEMPERATURE: 98.3 F | RESPIRATION RATE: 18 BRPM | DIASTOLIC BLOOD PRESSURE: 87 MMHG | WEIGHT: 200 LBS | SYSTOLIC BLOOD PRESSURE: 130 MMHG

## 2024-12-16 PROCEDURE — 99024 POSTOP FOLLOW-UP VISIT: CPT | Performed by: OBSTETRICS & GYNECOLOGY

## 2024-12-16 PROCEDURE — NC001 PR NO CHARGE: Performed by: OBSTETRICS & GYNECOLOGY

## 2024-12-16 RX ORDER — ECHINACEA PURPUREA EXTRACT 125 MG
1 TABLET ORAL
Start: 2024-12-16

## 2024-12-16 RX ORDER — BENZOCAINE/MENTHOL 6 MG-10 MG
1 LOZENGE MUCOUS MEMBRANE DAILY PRN
Start: 2024-12-16

## 2024-12-16 RX ORDER — NIFEDIPINE 30 MG/1
30 TABLET, EXTENDED RELEASE ORAL DAILY
Qty: 30 TABLET | Refills: 2 | Status: SHIPPED | OUTPATIENT
Start: 2024-12-16

## 2024-12-16 RX ADMIN — DOCUSATE SODIUM 100 MG: 100 CAPSULE, LIQUID FILLED ORAL at 08:12

## 2024-12-16 RX ADMIN — Medication 1 TABLET: at 08:12

## 2024-12-16 RX ADMIN — FAMOTIDINE 10 MG: 20 TABLET, FILM COATED ORAL at 08:12

## 2024-12-16 RX ADMIN — IBUPROFEN 600 MG: 600 TABLET ORAL at 01:41

## 2024-12-16 RX ADMIN — NIFEDIPINE 30 MG: 30 TABLET, FILM COATED, EXTENDED RELEASE ORAL at 08:17

## 2024-12-16 NOTE — PLAN OF CARE
Problem: PAIN - ADULT  Goal: Verbalizes/displays adequate comfort level or baseline comfort level  Description: Interventions:  - Encourage patient to monitor pain and request assistance  - Assess pain using appropriate pain scale  - Administer analgesics based on type and severity of pain and evaluate response  - Implement non-pharmacological measures as appropriate and evaluate response  - Consider cultural and social influences on pain and pain management  - Notify physician/advanced practitioner if interventions unsuccessful or patient reports new pain  Outcome: Progressing     Problem: Knowledge Deficit  Goal: Patient/family/caregiver demonstrates understanding of disease process, treatment plan, medications, and discharge instructions  Description: Complete learning assessment and assess knowledge base.  Interventions:  - Provide teaching at level of understanding  - Provide teaching via preferred learning methods  Outcome: Progressing     Problem: DISCHARGE PLANNING  Goal: Discharge to home or other facility with appropriate resources  Description: INTERVENTIONS:  - Identify barriers to discharge w/patient and caregiver  - Arrange for needed discharge resources and transportation as appropriate  - Identify discharge learning needs (meds, wound care, etc.)  - Arrange for interpretive services to assist at discharge as needed  - Refer to Case Management Department for coordinating discharge planning if the patient needs post-hospital services based on physician/advanced practitioner order or complex needs related to functional status, cognitive ability, or social support system  Outcome: Progressing     Problem: INFECTION - ADULT  Goal: Absence or prevention of progression during hospitalization  Description: INTERVENTIONS:  - Assess and monitor for signs and symptoms of infection  - Monitor lab/diagnostic results  - Monitor all insertion sites, i.e. indwelling lines, tubes, and drains  - Monitor endotracheal  if appropriate and nasal secretions for changes in amount and color  - [place] appropriate cooling/warming therapies per order  - Administer medications as ordered  - Instruct and encourage patient and family to use good hand hygiene technique  - Identify and instruct in appropriate isolation precautions for identified infection/condition  Outcome: Progressing  Goal: Absence of fever/infection during neutropenic period  Description: INTERVENTIONS:  - Monitor WBC    Outcome: Progressing     Problem: SAFETY ADULT  Goal: Patient will remain free of falls  Description: INTERVENTIONS:  - Educate patient/family on patient safety including physical limitations  - Instruct patient to call for assistance with activity   - Consult OT/PT to assist with strengthening/mobility   - Keep Call bell within reach  - Keep bed low and locked with side rails adjusted as appropriate  - Keep care items and personal belongings within reach  - Initiate and maintain comfort rounds  - Make Fall Risk Sign visible to staff  - Apply yellow socks and bracelet for high fall risk patients  - Consider moving patient to room near nurses station  Outcome: Progressing  Goal: Maintain or return to baseline ADL function  Description: INTERVENTIONS:  -  Assess patient's ability to carry out ADLs; assess patient's baseline for ADL function and identify physical deficits which impact ability to perform ADLs (bathing, care of mouth/teeth, toileting, grooming, dressing, etc.)  - Assess/evaluate cause of self-care deficits   - Assess range of motion  - Assess patient's mobility; develop plan if impaired  - Assess patient's need for assistive devices and provide as appropriate  - Encourage maximum independence but intervene and supervise when necessary  - Involve family in performance of ADLs  - Assess for home care needs following discharge   - Consider OT consult to assist with ADL evaluation and planning for discharge  - Provide patient education as  appropriate  Outcome: Progressing  Goal: Maintains/Returns to pre admission functional level  Description: INTERVENTIONS:  - Perform AM-PAC 6 Click Basic Mobility/ Daily Activity assessment daily.  - Set and communicate daily mobility goal to care team and patient/family/caregiver.   - Collaborate with rehabilitation services on mobility goals if consulted  - Out of bed for toileting  - Record patient progress and toleration of activity level   Outcome: Progressing     Problem: POSTPARTUM  Goal: Experiences normal postpartum course  Description: INTERVENTIONS:  - Monitor maternal vital signs  - Assess uterine involution and lochia  Outcome: Progressing  Goal: Appropriate maternal -  bonding  Description: INTERVENTIONS:  - Identify family support  - Assess for appropriate maternal/infant bonding   -Encourage maternal/infant bonding opportunities  - Referral to  or  as needed  Outcome: Progressing  Goal: Establishment of infant feeding pattern  Description: INTERVENTIONS:  - Assess breast/bottle feeding  - Refer to lactation as needed  Outcome: Progressing  Goal: Incision(s), wounds(s) or drain site(s) healing without S/S of infection  Description: INTERVENTIONS  - Assess and document dressing, incision, wound bed, drain sites and surrounding tissue  - Provide patient and family education  - Perform skin care/dressing changes   Outcome: Progressing

## 2024-12-16 NOTE — CASE MANAGEMENT
Case Management Progress Note    Patient name Elda Flores  Location /-01 MRN [id number]  : 2000 Date 2024       LOS (days): 3  Geometric Mean LOS (GMLOS) (days):   Days to GMLOS:        OBJECTIVE:        Current admission status: Inpatient  Preferred Pharmacy:   [facility]/pharmacy #[id number] - [place] - [address]  [place] [address]  Phone: [phone] Fax: [phone]    Primary Care Provider: Ben Suarez DO    Primary Insurance: [employer]  Secondary Insurance:     PROGRESS NOTE:    CM received consult for [facility] requesting Zomee for home use. Order placed to [employer] via [place]. As per [employer] they are out of network with patients insurance, CM made MOB aware, she reported she would like to purchase pump, [employer] confirmed payment received, Pump delivered to bedside by CM.

## 2024-12-16 NOTE — CONSULTS
CONSULT - LACTATION  Elda Flores 24 y.o. female MRN: [id number]    [facility] AN L&D Room / Bed:  307/ 307-01 Encounter: [id number]    Maternal Information     MOTHER:  N/A  Maternal Age: This patient's mother is not on file.  OB History: This patient's mother is not on file.  Previouse breast reduction surgery? No    Lactation history:   Has patient previously breast fed: No   How long had patient previously breast fed:     Previous breast feeding complications:     This patient's mother is not on file.    Birth information:  YOB: 2000   Time of birth:     Sex: female   Delivery type:     Birth Weight: No birth weight on file.   Percent of Weight Change: Birth weight not on file     Gestational Age: <None>   [unfilled]    Assessment     Breast and nipple assessment:  no clinical exam done       12/16/24 0943   Lactation Consultation   Reason for Consult 20 minutes;10 minute   Lactation Consultant Total Time 30   Risk Factors LPI   Maternal Information   Has mother  before? No   Exclusive Pump and Bottle Feed No   Breasts/Nipples   Date Pumping Initiated 12/15/24   Intervention Breast pump;Hand expression   Breastfeeding Status Yes   Breastfeeding Progress Not yet established   Reasons for not Breastfeeding Infant medical condition   Other OB Lactation Tools   Feeding Devices Bottle;Pump;Syringe   Breast Pump   Pump 3;2;1  (CM Consult for breast pump)   Pump Review/Education Setup, frequency, and cleaning;Milk storage   Initiated by Kishor Arauz   Date Initiated 12/15/24   Patient Follow-Up   Lactation Consult Status 2   Follow-Up Type Inpatient;Call as needed   Other OB Lactation Documentation    Additional Problem Noted DC: sleepy, LPI, mixed feedign plan. Enc to call for latch assessment.        Feeding recommendations:   mixed feeding plan     DC: baby sleepy, LPI. Mom states she is putting baby to breast at times, she will use the electric pump  1x daily to stimulate breasts. Education given on mixed feeding plan. Enc to put baby to breast every feeding and pump when able to after feedings. Education given on establishing milk supply. Enc to call for latch assessment.     Feeding Plan:     1. Meet early feeding cues   2. Bring baby to breast skin to skin   3. Extend chin, anchoring it onto the breast to assist with deeper latch   4. Align nipple to nose, not sliding it to the lower lip, but instead, pivoting the compressed areola over the lower lip if using parent led latching so as to have the nipple come to rest in the arch of the baby's mouth   5. May use breast compressions to stimulate suck and provide more breast milk for enticement.   6. Introduce breast first for feeding, unless baby is not latching. May use hand expression to entice baby to wake   7. Feed infant expressed breast milk after breast feeding   8. Then, feed baby formula/donor breast milk per your preference   9.  Pump after as many feedings at the breast as reasonable   10. Follow up with outpatient lactation as soon as possible     Pumping:   - When pumping, begin in stimulation mode (high cycle, low vacuum) until milk begins to express. Change pump to expression mode (low cycle, high vacuum). Use hands on pumping techniques to assist with milk transfer. When milk stops expressing, change back to stimulation mode. When milk begins to flow, change to expression mode. You may cycle pump up to three times in a pumping session.  Instructions given on pumping.  Discussed when to start, frequency, different pumps available versus manual expression.    Encouraged parents to call for assistance, questions, and concerns about breastfeeding.  Extension provided.      Sarah Cornell MA 12/16/2024 9:45 AM

## 2024-12-16 NOTE — PLAN OF CARE
Problem: PAIN - ADULT  Goal: Verbalizes/displays adequate comfort level or baseline comfort level  Description: Interventions:  - Encourage patient to monitor pain and request assistance  - Assess pain using appropriate pain scale  - Administer analgesics based on type and severity of pain and evaluate response  - Implement non-pharmacological measures as appropriate and evaluate response  - Consider cultural and social influences on pain and pain management  - Notify physician/advanced practitioner if interventions unsuccessful or patient reports new pain  Outcome: Adequate for Discharge     Problem: Knowledge Deficit  Goal: Patient/family/caregiver demonstrates understanding of disease process, treatment plan, medications, and discharge instructions  Description: Complete learning assessment and assess knowledge base.  Interventions:  - Provide teaching at level of understanding  - Provide teaching via preferred learning methods  Outcome: Adequate for Discharge     Problem: DISCHARGE PLANNING  Goal: Discharge to home or other facility with appropriate resources  Description: INTERVENTIONS:  - Identify barriers to discharge w/patient and caregiver  - Arrange for needed discharge resources and transportation as appropriate  - Identify discharge learning needs (meds, wound care, etc.)  - Arrange for interpretive services to assist at discharge as needed  - Refer to Case Management Department for coordinating discharge planning if the patient needs post-hospital services based on physician/advanced practitioner order or complex needs related to functional status, cognitive ability, or social support system  Outcome: Adequate for Discharge     Problem: INFECTION - ADULT  Goal: Absence or prevention of progression during hospitalization  Description: INTERVENTIONS:  - Assess and monitor for signs and symptoms of infection  - Monitor lab/diagnostic results  - Monitor all insertion sites, i.e. indwelling lines, tubes,  and drains  - Monitor endotracheal if appropriate and nasal secretions for changes in amount and color  - [place] appropriate cooling/warming therapies per order  - Administer medications as ordered  - Instruct and encourage patient and family to use good hand hygiene technique  - Identify and instruct in appropriate isolation precautions for identified infection/condition  Outcome: Adequate for Discharge  Goal: Absence of fever/infection during neutropenic period  Description: INTERVENTIONS:  - Monitor WBC    Outcome: Adequate for Discharge     Problem: SAFETY ADULT  Goal: Patient will remain free of falls  Description: INTERVENTIONS:  - Educate patient/family on patient safety including physical limitations  - Instruct patient to call for assistance with activity   - Consult OT/PT to assist with strengthening/mobility   - Keep Call bell within reach  - Keep bed low and locked with side rails adjusted as appropriate  - Keep care items and personal belongings within reach  - Initiate and maintain comfort rounds  - Make Fall Risk Sign visible to staff  - Apply yellow socks and bracelet for high fall risk patients  - Consider moving patient to room near nurses station  Outcome: Adequate for Discharge  Goal: Maintain or return to baseline ADL function  Description: INTERVENTIONS:  -  Assess patient's ability to carry out ADLs; assess patient's baseline for ADL function and identify physical deficits which impact ability to perform ADLs (bathing, care of mouth/teeth, toileting, grooming, dressing, etc.)  - Assess/evaluate cause of self-care deficits   - Assess range of motion  - Assess patient's mobility; develop plan if impaired  - Assess patient's need for assistive devices and provide as appropriate  - Encourage maximum independence but intervene and supervise when necessary  - Involve family in performance of ADLs  - Assess for home care needs following discharge   - Consider OT consult to assist with ADL  evaluation and planning for discharge  - Provide patient education as appropriate  Outcome: Adequate for Discharge  Goal: Maintains/Returns to pre admission functional level  Description: INTERVENTIONS:  - Perform AM-PAC 6 Click Basic Mobility/ Daily Activity assessment daily.  - Set and communicate daily mobility goal to care team and patient/family/caregiver.   - Collaborate with rehabilitation services on mobility goals if consulted  - Out of bed for toileting  - Record patient progress and toleration of activity level   Outcome: Adequate for Discharge     Problem: POSTPARTUM  Goal: Experiences normal postpartum course  Description: INTERVENTIONS:  - Monitor maternal vital signs  - Assess uterine involution and lochia  Outcome: Adequate for Discharge  Goal: Appropriate maternal -  bonding  Description: INTERVENTIONS:  - Identify family support  - Assess for appropriate maternal/infant bonding   -Encourage maternal/infant bonding opportunities  - Referral to  or  as needed  Outcome: Adequate for Discharge  Goal: Establishment of infant feeding pattern  Description: INTERVENTIONS:  - Assess breast/bottle feeding  - Refer to lactation as needed  Outcome: Adequate for Discharge  Goal: Incision(s), wounds(s) or drain site(s) healing without S/S of infection  Description: INTERVENTIONS  - Assess and document dressing, incision, wound bed, drain sites and surrounding tissue  - Provide patient and family education  - Perform skin care/dressing changes   Outcome: Adequate for Discharge

## 2024-12-16 NOTE — PROGRESS NOTES
"  Progress Note - OB/GYN   Elda Flores 24 y.o. female MRN: [id number]  Unit/Bed#:  307-01 Encounter: [id number]    Assessment:  Post partum Day #2 s/p , stable, baby in the room. Discharge home today.    Subjective/Objective   Chief Complaint:     Post delivery. Patient is doing well. Lochia WNL. Pain well controlled.     Subjective:     Pain: yes, cramping, improved with meds  Tolerating PO: yes  Voiding: yes  Flatus: yes  Ambulating: yes  Chest pain: no  Shortness of breath: no  Leg pain: no  Lochia: minimal    Objective:     Vitals: /83   Pulse (!) 52   Temp 98 °F (36.7 °C) (Oral)   Resp 18   Ht 5' 4\" (1.626 m)   Wt 90.7 kg (200 lb)   LMP 2024 (Within Days)   SpO2 97%   Breastfeeding Yes   BMI 34.33 kg/m²     I/O          0701   0700  0701  12/15 0700    P.O. 240     I.V. (mL/kg) 2095.2 (23.1)     IV Piggyback 600     Total Intake(mL/kg) 2935.2 (32.4)     Urine (mL/kg/hr) 1500 1075 (0.5)    Blood  108    Total Output 1500 1183    Net +1435.2 -1183                  Lab Results   Component Value Date    WBC 15.18 (H) 12/15/2024    HGB 10.2 (L) 12/15/2024    HCT 31.1 (L) 12/15/2024    MCV 89 12/15/2024     12/15/2024       Physical Exam:     Gen: AAOx3, NAD  CV: no acute distress  Lungs: no acute distress  Abd: Soft, non-tender, non-distended, no rebound or guarding  Uterine fundus firm and non-tender, below the umbilicus.  Ext: Non tender, trace edema.         "

## 2024-12-16 NOTE — ASSESSMENT & PLAN NOTE
Started on procardia XL 30 daily for BP consistently over 140/90. Will discharge home with precautions, plan for BP check either review of home monitoring or office visit within 7-10 days.       CBC, CMP wnl, UPC 0.6  BP monitoring  Mag started  1547  Labs 6/6h    Systolic (12hrs), Av , Min:130 , Max:149   Diastolic (12hrs), Av, Min:81, Max:87

## 2024-12-18 ENCOUNTER — TELEPHONE (OUTPATIENT)
Dept: OBGYN CLINIC | Facility: CLINIC | Age: 24
End: 2024-12-18

## 2024-12-18 NOTE — TELEPHONE ENCOUNTER
----- Message from Lyndsay TESFAYE sent at 12/17/2024 10:13 AM EST -----  Regarding: Postpartum Call Due  delivered

## 2024-12-18 NOTE — TELEPHONE ENCOUNTER
Left message for postpartum call- sent MyChart message    1 week bp check on 12/24   Pp visit on 1/8

## 2024-12-23 PROCEDURE — 88307 TISSUE EXAM BY PATHOLOGIST: CPT | Performed by: STUDENT IN AN ORGANIZED HEALTH CARE EDUCATION/TRAINING PROGRAM

## 2024-12-23 PROCEDURE — 88342 IMHCHEM/IMCYTCHM 1ST ANTB: CPT | Performed by: STUDENT IN AN ORGANIZED HEALTH CARE EDUCATION/TRAINING PROGRAM

## 2024-12-24 ENCOUNTER — POSTPARTUM VISIT (OUTPATIENT)
Dept: OBGYN CLINIC | Facility: MEDICAL CENTER | Age: 24
End: 2024-12-24

## 2024-12-24 VITALS — BODY MASS INDEX: 29.7 KG/M2 | SYSTOLIC BLOOD PRESSURE: 140 MMHG | WEIGHT: 173 LBS | DIASTOLIC BLOOD PRESSURE: 106 MMHG

## 2024-12-24 PROCEDURE — 99024 POSTOP FOLLOW-UP VISIT: CPT | Performed by: NURSE PRACTITIONER

## 2024-12-24 NOTE — PROGRESS NOTES
"Elda Flores  2000    S:  24 y.o. female here for postpartum visit accompanied by son and FOB.  She is s/p Vaginal delivery complicated by preE with SF.   on 12/14/24 at 1605. Treated with mag.     1st degree lac repaired. Adela any pain. Normal mod to light flow lochia rubra.     Procardia XL 30 mg po daily. Took today at 10am. BP now 140/106. Admits to feeling nervous about this appt.   Asymptomatic. States she feels significantly better and less swollen.  .      Here today for follow up BP check.      Gender: male \"Adler\"   Apgars: 7, 9  Weight: 5 lbs 14 oz. Now weighs 5 lbs 11 oz. Next appt in one week.   Her lochia has resolved.    She is breastfeeding and pumping but only about twice per day. Desires continuation of breast feeding. Aware that she should breastfeed or pump Q 2 hours during day/wake hours and every 3-4 hours in the middle of night/sleep hours. Declined referral to [facility] at this point.   She denies postpartum blues/depression.  Her EPDS is 5.    Substance use screen:    Last Pap: 06/24/2024 normal         O:   LMP 03/29/2024 (Within Days)   She appears well and in no distress  Repeat BP at end of visit of 124/88      A/P:  Postpartum visit.   Continue procardia XL 30 mg po daily  Return to office for BP check in one week.  Monitor BP at home twice daily calling office with elevations of 140/90 or higher. Keep a log and bring into next appt  Continue prenatal vitamin daily while breastfeeding   Consider taking prenatal vitamin 6-12 months before a future pregnancy to reduce risk of neural tube defect.  If symptoms of depression occur, please call the office to schedule an appt. This may happen up until your baby's first birthday.    "

## 2024-12-24 NOTE — PATIENT INSTRUCTIONS
Continue procardia XL 30 mg po daily  Return to office for BP check in one week.  Monitor BP at home twice daily calling office with elevations of 140/90 or higher. Keep a log and bring into next appt  Continue prenatal vitamin daily while breastfeeding   Consider taking prenatal vitamin 6-12 months before a future pregnancy to reduce risk of neural tube defect.  If symptoms of depression occur, please call the office to schedule an appt. This may happen up until your baby's first birthday.

## 2024-12-31 ENCOUNTER — POSTPARTUM VISIT (OUTPATIENT)
Dept: OBGYN CLINIC | Facility: MEDICAL CENTER | Age: 24
End: 2024-12-31

## 2024-12-31 VITALS — WEIGHT: 169 LBS | BODY MASS INDEX: 29.01 KG/M2 | SYSTOLIC BLOOD PRESSURE: 112 MMHG | DIASTOLIC BLOOD PRESSURE: 94 MMHG

## 2024-12-31 DIAGNOSIS — Z87.59 HISTORY OF PRE-ECLAMPSIA: ICD-10-CM

## 2024-12-31 PROCEDURE — 99024 POSTOP FOLLOW-UP VISIT: CPT | Performed by: NURSE PRACTITIONER

## 2024-12-31 NOTE — PATIENT INSTRUCTIONS
BP check today  Monitor BP daily for next week. Bring BP log into next appt.along with the actual cuff to we can verify it's accuracy.   Continue daily prenatal vitamin.   Return to office in one week for postpartum visit.  If symptoms of depression occur, please call the office to schedule an appt. This may happen up until your baby's first birthday.  Continue procardia Xl 30 mg po daily.   Referral placed for [facility] lactation services. Call to schedule.   Remain adequately hydrated and eat  well balanced meals

## 2024-12-31 NOTE — PROGRESS NOTES
"Elda Flores  2000    S:  24 y.o. female here for postpartum visit.  She is s/p Vaginal delivery complicated by preE with SF.   on 12/14/24 at 1605. Treated with mag.     Procardia XL 30 mg po daily. BP today of 112/94. Asymptomatic. She has not taken her procardia yet today. She she monitored her BP twice this past week. States findings < 140/90.    1st degree lac repaired. Adela any pain. Normal light flow lochia rubra.        Here today for follow up BP check.      Gender: male \"Adler\" . He has pedi follow up in 2 days.   Apgars: 7, 9  Weight: 5 lbs 14 oz. Now weighs 5 lbs 11 oz.  Her lochia has resolved.    She is occ breastfeeding  but is pumping every 2-3 hours. She feels her breast milk production is not increasing as expected. Able to pump approx 1 oz from both breasts every 3 hours.    She denies postpartum blues/depression.  Her EPDS is 7.      Last Pap: 06/24/2024 normal       O:   /94 (BP Location: Left arm, Patient Position: Sitting, Cuff Size: Standard)   Wt 76.7 kg (169 lb)   LMP 03/29/2024 (Within Days)   Breastfeeding Yes   BMI 29.01 kg/m²   She appears well and in no distress      A/P:  BP check today  Monitor BP daily for next week. Bring BP log into next appt.along with the actual cuff to we can verify it's accuracy.   Continue daily prenatal vitamin.   Return to office in one week for postpartum visit.  If symptoms of depression occur, please call the office to schedule an appt. This may happen up until your baby's first birthday.  Continue procardia Xl 30 mg po daily.   Referral placed for [facility] lactation services. Call to schedule.   Remain adequately hydrated and eat  well balanced meals  " Epidermal Sutures: 5-0 Fast Absorbing Gut

## 2025-01-08 ENCOUNTER — POSTPARTUM VISIT (OUTPATIENT)
Dept: OBGYN CLINIC | Facility: MEDICAL CENTER | Age: 25
End: 2025-01-08

## 2025-01-08 VITALS
DIASTOLIC BLOOD PRESSURE: 78 MMHG | WEIGHT: 173 LBS | SYSTOLIC BLOOD PRESSURE: 122 MMHG | HEIGHT: 64 IN | BODY MASS INDEX: 29.53 KG/M2

## 2025-01-08 DIAGNOSIS — O14.13 SEVERE PRE-ECLAMPSIA IN THIRD TRIMESTER: ICD-10-CM

## 2025-01-08 DIAGNOSIS — R21: ICD-10-CM

## 2025-01-08 DIAGNOSIS — O99.713: ICD-10-CM

## 2025-01-08 PROBLEM — Z34.03 PRENATAL CARE, FIRST PREGNANCY IN THIRD TRIMESTER: Status: RESOLVED | Noted: 2024-12-13 | Resolved: 2025-01-08

## 2025-01-08 PROBLEM — O26.899 VAGINAL DISCHARGE DURING PREGNANCY: Chronic | Status: RESOLVED | Noted: 2024-11-02 | Resolved: 2025-01-08

## 2025-01-08 PROBLEM — N89.8 VAGINAL DISCHARGE DURING PREGNANCY: Chronic | Status: RESOLVED | Noted: 2024-11-02 | Resolved: 2025-01-08

## 2025-01-08 PROBLEM — O99.213 OBESITY AFFECTING PREGNANCY IN THIRD TRIMESTER: Status: RESOLVED | Noted: 2024-07-22 | Resolved: 2025-01-08

## 2025-01-08 PROCEDURE — PNV: Performed by: CLINICAL NURSE SPECIALIST

## 2025-01-08 NOTE — ASSESSMENT & PLAN NOTE
Normal BP- keep taking Procardia until about 6 wks pp and f/u with PCP to see if needs to continue

## 2025-01-08 NOTE — ASSESSMENT & PLAN NOTE
Rash continues and has worsened. See image in media.   Has not tried any topical meds. Advised benedryl 25 mg q 6 hrs and topical hydrocortisone. Ugent care if no better.

## 2025-01-08 NOTE — PROGRESS NOTES
Name: Elda Flores      : 2000      MRN: [id number]  Encounter Provider: ROQUE Key  Encounter Date: 2025   Encounter department: [facility] [place]    :  Assessment & Plan  Postpartum care and examination  3.5 wks PostPartum.  Normal postpartum exam.   The patient may advance activity as tolerated and may resume sexual activity at 6 wks PP.  Contraception discussed- and declined. Planning NFP/W/D/condoms  She will RTO for routine annual gyn exam in 2-3 mos.          Severe pre-eclampsia in third trimester  Normal BP- keep taking Procardia until about 6 wks pp and f/u with PCP to see if needs to continue        Pregnancy eruption in third trimester  Rash continues and has worsened. See image in media.   Has not tried any topical meds. Advised benedryl 25 mg q 6 hrs and topical hydrocortisone. Ugent care if no better.               Patient ID: Elda Flores is a 24 y.o. female.  She presents for routine postpartum visit  She is now a  who is 3.5wks s/p  on 24- S/P IOL for Pre-e w/ SF. D/C'd on procardia 30 mg XL  Anesthesia: epidural  Perineum: 1st degree perineal lac repair  Antepartum complications include:  Patient Active Problem List   Diagnosis   • Generalized anxiety disorder   • 36 weeks gestation of pregnancy   • Maternal care for other (suspected) fetal abnormality and damage, not applicable or unspecified   • Pregnancy eruption in third trimester   • Elevated blood pressure affecting pregnancy in third trimester, antepartum   • Preeclampsia, severe       She denies abn bleeding- lochia resolved, pelvic pain, breast complaints, bowel/bladder dysfunction, depression/anx.   Baby is thriving and is Bottle feeding, and also using breast pump and breastfeeding.   Still having a rash - started in the last month of pregnancy- now is becoming more itchy and more painful     [place]  Depression Scale Total: 5  Feels well  "overall  Plans for contraception: condoms    The following portions of the patient's history were reviewed and updated as appropriate: allergies, current medications, past family history, past medical history, past social history, past surgical history, and problem list.    Review of Systems           Objective   /78 (BP Location: Left arm, Patient Position: Sitting, Cuff Size: Standard)   Ht 5' 4\" (1.626 m)   Wt 78.5 kg (173 lb)   LMP 03/29/2024 (Within Days)   Breastfeeding Yes   BMI 29.70 kg/m²     Physical Exam  Constitutional:       General: She is not in acute distress.     Appearance: Normal appearance.   Genitourinary:      Vulva normal.      Right Labia: No rash, lesions or skin changes.     Left Labia: No lesions, skin changes or rash.     No vaginal discharge, erythema or ulceration.      Uterus is not enlarged (involuted).      Pelvic exam was performed with patient in the lithotomy position.   Cardiovascular:      Rate and Rhythm: Normal rate.   Pulmonary:      Effort: Pulmonary effort is normal.   Abdominal:      General: There is no distension.      Palpations: Abdomen is soft.   Musculoskeletal:         General: Normal range of motion.   Neurological:      Mental Status: She is alert and oriented to person, place, and time.   Skin:     General: Skin is warm and dry.   Psychiatric:         Mood and Affect: Mood normal.         Behavior: Behavior normal.                           "

## 2025-01-12 ENCOUNTER — OFFICE VISIT (OUTPATIENT)
Dept: URGENT CARE | Facility: CLINIC | Age: 25
End: 2025-01-12
Payer: COMMERCIAL

## 2025-01-12 VITALS
TEMPERATURE: 98 F | SYSTOLIC BLOOD PRESSURE: 115 MMHG | OXYGEN SATURATION: 98 % | HEART RATE: 78 BPM | RESPIRATION RATE: 18 BRPM | DIASTOLIC BLOOD PRESSURE: 84 MMHG | BODY MASS INDEX: 29.18 KG/M2 | WEIGHT: 170 LBS

## 2025-01-12 DIAGNOSIS — L73.9 FOLLICULITIS: Primary | ICD-10-CM

## 2025-01-12 PROCEDURE — G0383 LEV 4 HOSP TYPE B ED VISIT: HCPCS | Performed by: PHYSICIAN ASSISTANT

## 2025-01-12 PROCEDURE — 99284 EMERGENCY DEPT VISIT MOD MDM: CPT | Performed by: PHYSICIAN ASSISTANT

## 2025-01-12 RX ORDER — DICLOXACILLIN SODIUM 250 MG/1
250 CAPSULE ORAL EVERY 6 HOURS SCHEDULED
Qty: 28 CAPSULE | Refills: 0 | Status: SHIPPED | OUTPATIENT
Start: 2025-01-12 | End: 2025-01-19

## 2025-01-12 NOTE — PROGRESS NOTES
[facility]        NAME: Elda Flores is a 24 y.o. female  : 2000    MRN: [id number]  DATE: 2025  TIME: 1:42 PM    Assessment and Plan   Folliculitis [L73.9]  1. Folliculitis  dicloxacillin (DYNAPEN) 250 mg capsule            Patient Instructions   Spoke with the on-call Derm resident who agrees that this looks like folliculitis.  Will treat patient with dicloxacillin 250 mg 4 times a day for 7 days patient has breast-feeding and this is safer baby.  I advised her that if it does not resolve by the end of the antibiotic treatment she should follow-up with PCP.  Should anything change or worsen she should go to the ER.  If tests have been performed at [facility], our office will contact you with results if changes need to be made to the care plan discussed with you at the visit.  You can review your full results on [facility]  Follow up with PCP in 3-5 days.  Proceed to  ER if symptoms worsen.    Chief Complaint     Chief Complaint   Patient presents with    Rash     Rash all over body, started 3 weeks ago after she had her baby. Started on back and has moved to chest and neck area.  Benadryl and Hydrocortisone.          History of Present Illness       Rash  Pertinent negatives include no fever or shortness of breath.     This is a 24-year-old female here complaining of a rash on her chest, back and neck into face for the last 6 weeks.  She notes the rash started on her back 6 weeks ago and then she had a baby 4 weeks ago and it progressed to the other involving areas.  She states that rash is itchy and painful to palpation.  She did see OB and they advised her to take oral Benadryl and apply hydrocortisone cream and use oatmeal soap which she did.  She notes this helped with itching.  She denies fever, vomiting or diarrhea, shortness of breath or chest pain.  Review of Systems   Review of Systems   Constitutional:  Negative for fever.   Respiratory:  Negative for shortness of  breath.    Cardiovascular:  Negative for chest pain.   Skin:  Positive for rash.         Current Medications       Current Outpatient Medications:     dicloxacillin (DYNAPEN) 250 mg capsule, Take 1 capsule (250 mg total) by mouth every 6 (six) hours for 7 days, Disp: 28 capsule, Rfl: 0    benzocaine-menthol-lanolin-aloe (DERMOPLAST) 20-0.5 % topical spray, Apply 1 Application topically every 6 (six) hours as needed for mild pain (Patient not taking: Reported on 1/12/2025), Disp: , Rfl:     Doxylamine Succinate, Sleep, (UNISOM PO), Take by mouth (Patient not taking: Reported on 1/12/2025), Disp: , Rfl:     famotidine (PEPCID) 10 mg tablet, Take 10 mg by mouth 2 (two) times a day (Patient not taking: Reported on 1/12/2025), Disp: , Rfl:     hydrocortisone 1 % cream, Apply 1 Application topically daily as needed for irritation (Patient not taking: Reported on 1/12/2025), Disp: , Rfl:     NIFEdipine (PROCARDIA XL) 30 mg 24 hr tablet, Take 1 tablet (30 mg total) by mouth daily (Patient not taking: Reported on 1/12/2025), Disp: 30 tablet, Rfl: 2    ondansetron (ZOFRAN-ODT) 4 mg disintegrating tablet, Take 1 tablet (4 mg total) by mouth every 8 (eight) hours as needed for nausea or vomiting Take one half tab to start. (Patient not taking: Reported on 1/12/2025), Disp: 30 tablet, Rfl: 1    Prenatal Vit w/Zb-Cqzaxwqrc-TL (PNV PO), Take by mouth (Patient not taking: Reported on 1/12/2025), Disp: , Rfl:     Pyridoxine HCl (B-6 PO), Take by mouth (Patient not taking: Reported on 1/12/2025), Disp: , Rfl:     sodium chloride (OCEAN) 0.65 % nasal spray, 1 spray into each nostril every hour as needed for congestion (Patient not taking: Reported on 1/12/2025), Disp: , Rfl:     witch hazel-glycerin (TUCKS) topical pad, Apply 1 Pad topically every 4 (four) hours as needed for irritation (Patient not taking: Reported on 1/12/2025), Disp: , Rfl:     Current Allergies     Allergies as of 01/12/2025    (No Known Allergies)            The  following portions of the patient's history were reviewed and updated as appropriate: allergies, current medications, past family history, past medical history, past social history, past surgical history and problem list.     Past Medical History:   Diagnosis Date    Anxiety     no medications       No past surgical history on file.    Family History   Problem Relation Age of Onset    Alcohol abuse Father     Liver disease Father     No Known Problems Half-Sister     Asthma Half-Brother     No Known Problems Maternal Grandmother     Colon cancer Maternal Grandfather     Skin cancer Maternal Grandfather     Liver disease Paternal Grandfather     No Known Problems Half-Sister     Asthma Half-Brother     Breast cancer Neg Hx     Ovarian cancer Neg Hx     Uterine cancer Neg Hx          Medications have been verified.        Objective   /84   Pulse 78   Temp 98 °F (36.7 °C)   Resp 18   Wt 77.1 kg (170 lb)   SpO2 98%   BMI 29.18 kg/m²        Physical Exam     Physical Exam  Vitals and nursing note reviewed.   Constitutional:       Appearance: Normal appearance.   Cardiovascular:      Rate and Rhythm: Normal rate and regular rhythm.   Pulmonary:      Effort: Pulmonary effort is normal.      Breath sounds: Normal breath sounds.   Skin:     Comments: There is a papular rash covering her back, torso and through her neck and up into her jaw. See photo    Neurological:      Mental Status: She is alert.

## 2025-01-13 ENCOUNTER — TELEPHONE (OUTPATIENT)
Dept: DERMATOLOGY | Facility: CLINIC | Age: 25
End: 2025-01-13

## 2025-01-13 NOTE — TELEPHONE ENCOUNTER
Received return call from Patient to schedule [facility] appt. Scheduled 1/16/25 at 3:00 pm with [facility] in [place]. Verified insurance, provided [place] address.     Patient asked followup question regarding her medications prescribed during her Urgent Care visit, referred her back to her Urgent Care for advice.     Patient verbalized understanding.

## 2025-01-13 NOTE — TELEPHONE ENCOUNTER
Per [employer] message-      Erin Merchant MD sent to [facility]; Elda Salinas!    Can we get this patient scheduled into [facility] this week for follow up from Urgent Care?    Thanks!  Erin Merchant MD  Dermatology, PGY-2    Called pt to get her scheduled per above message but n/a, left v/m with b info

## 2025-01-16 ENCOUNTER — OFFICE VISIT (OUTPATIENT)
Dept: DERMATOLOGY | Facility: CLINIC | Age: 25
End: 2025-01-16
Payer: COMMERCIAL

## 2025-01-16 VITALS — TEMPERATURE: 97.6 F | WEIGHT: 171 LBS | BODY MASS INDEX: 29.35 KG/M2

## 2025-01-16 DIAGNOSIS — L73.9 FOLLICULITIS: Primary | ICD-10-CM

## 2025-01-16 PROCEDURE — 99204 OFFICE O/P NEW MOD 45 MIN: CPT | Performed by: DERMATOLOGY

## 2025-01-16 PROCEDURE — 87205 SMEAR GRAM STAIN: CPT

## 2025-01-16 PROCEDURE — 87077 CULTURE AEROBIC IDENTIFY: CPT

## 2025-01-16 PROCEDURE — 87070 CULTURE OTHR SPECIMN AEROBIC: CPT

## 2025-01-16 PROCEDURE — 87186 SC STD MICRODIL/AGAR DIL: CPT

## 2025-01-16 RX ORDER — DOXYCYCLINE 100 MG/1
100 CAPSULE ORAL 2 TIMES DAILY
Qty: 28 CAPSULE | Refills: 0 | Status: SHIPPED | OUTPATIENT
Start: 2025-01-16 | End: 2025-01-30

## 2025-01-16 RX ORDER — MOMETASONE FUROATE 1 MG/G
OINTMENT TOPICAL
Qty: 45 G | Refills: 3 | Status: SHIPPED | OUTPATIENT
Start: 2025-01-16

## 2025-01-16 NOTE — PATIENT INSTRUCTIONS
FOLLICULITIS    Assessment and Plan:  Based on a thorough discussion of this condition and the management approach to it (including a comprehensive discussion of the known risks, side effects and potential benefits of treatment), the patient (family) agrees to implement the following specific plan:  Wound culture taken today  Start Doxycycline 100 mg twice daily for 2 weeks; take with full glass of water and food  Apply Vaseline/ moisturizer areas  Starting two days from now Mometasone 1 % ointment twice daily until clear   Follow up in 2 weeks    What is folliculitis?  Folliculitis is the name given to a group of skin conditions in which there are inflamed hair follicles. The result is a tender red spot, often with a surface pustule.  Folliculitis may be superficial or deep. It can affect anywhere there are hairs, including chest, back, buttocks, arms and legs. Acne and its variants are also types of folliculitis.    What causes folliculitis?  Folliculitis can be due to infection, occlusion (blockage), irritation and various skin diseases.    Folliculitis due to infection  To determine if folliculitis is due to an infection, swabs should be taken from the pustules for cytology and culture in the laboratory.    Bacteria  Bacterial folliculitis is commonly due to Staphylococcus aureus. If the infection involves the deep part of the follicle, it results in a painful boil. Recommended treatment includes careful hygiene, antiseptic cleanser or cream, antibiotic ointment, and/or oral antibiotics.    Spa pool folliculitis is due to infection with Pseudomonas aeruginosa, which thrives in inadequately chlorinated warm water. Gram negative folliculitis is a pustular facial eruption also due to infection with Pseudomonas aeruginosa or other similar organisms. When it appears, it usually follows tetracycline treatment of acne, but is quite rare.    Yeasts  The most common yeast to cause a folliculitis is Pityrosporum ovale,  also known as Malassezia. Malassezia folliculitis (Pityrosporum folliculitis) is an itchy acne-like condition usually affecting the upper trunk of a young adult. Treatment includes avoiding moisturisers, stopping any antibiotics and topical antifungal or oral antifungal medication for several weeks.    Candida albicans can also provoke a folliculitis in skin folds (intertrigo) or in the beard area. It is treated with topical or oral antifungal agents.    Fungi  Ringworm of the scalp (tinea capitis) usually results in scaling and hair loss, but sometimes results in folliculitis. In [place], cat ringworm (Microsporum canis) is the commonest organism causing scalp fungal infection. Other fungi such as Trichophyton tonsurans are increasingly reported. Treatment is with oral antifungal agents for several months.    Viral infections  Folliculitis may caused by herpes simplex virus. This tends to be tender, and resolves without treatment in around 10 days. Severe recurrent attacks may be treated with acyclovir and other antiviral agents.    Herpes zoster (the cause of shingles) may also present as folliculitis with painful pustules and crusted spots within a dermatome (an area of skin supplied by a single nerve). It is treated with hihg-dose acyclovir.  Molluscum contagiosum, common in young children, may also cause follicular umbilicated papules, usually clustered in and around a body fold. Molluscum may provoke dermatitis.    Parasitic infection  Folliculitis on the face or scalp of older or immunosuppressed adults may be due to colonisation by hair follicle mites (demodex). This is known as demodicosis.  The human infestation, scabies, often provokes folliculitis, as well as non-follicular papules, vesicles and pustules.    Folliculitis due to irritation from regrowing hairs  Folliculitis may arise as hairs regrow after shaving, waxing, electrolysis or plucking. Swabs taken from the pustules are sterile ie there is  no growth of bacteria or other organisms. In the beard area irritant folliculitis is known as pseudofolliculitis barbae.  Irritant folliculitis is also common on the lower legs of women (shaving rash). It is frequently very itchy. Treatment is by stopping hair removal, and not beginning again for about three months after the folliculitis has settled. To prevent reoccurring irritant folliculitis, use a gentle hair removal method, such as a lady's electric razor. Avoid soap and apply plenty of shaving gel, if using a blade shaver.    Folliculitis due to contact reactions  Occlusion  Paraffin-based ointments, moisturisers, and adhesive plasters may all result in a sterile folliculitis. If a moisturiser is needed, choose an oil-free product, as it is less likely to cause occlusion.    Chemicals  Coal tar, cutting oils and other chemicals may cause an irritant folliculitis. Avoid contact with the causative product.    Topical steroids  Overuse of topical steroids may produce a folliculitis. Perioral dermatitis is a facial folliculitis provoked by moisturisers and topical steroids. Perioral dermatitis is treated with tetracycline antibiotics for six weeks or so.    Folliculitis due to immunosuppression  Eosinophilic folliculitis is a specific type of folliculitis that may arise in some immune suppressed individuals such as those infected by human immunodeficiency virus (HIV) or those who have cancer.    Folliculitis due to drugs  Folliculitis may be due to drugs, particularly corticosteroids (steroid acne), androgens (male hormones), ACTH, lithium, isoniazid (INH), phenytoin and B-complex vitamins. Protein kinase inhibitors (epidermal growth factor receptor inhibitors) and targeted therapy for metastatic melanoma (vemurafenib, dabrafenib) nearly always result in folliculitis.    Folliculitis due to inflammatory skin diseases  Certain uncommon inflammatory skin diseases may cause permanent hair loss and scarring because of  deep seated sterile folliculitis. These include:  Lichen planus  Discoid lupus erythematosus  Folliculitis decalvans  Folliculitis keloidalis     Treatment depends on the underlying condition and its severity. A skin biopsy is often necessary to establish the diagnosis.    Acne variants   Acne and acne-like or acneform disorders are also forms of folliculitis. These include:  Acne vulgaris  Nodulocystic acne  Rosacea  Scalp folliculitis  Chloracne    The follicular occlusion syndrome refers to:  Hidradenitis suppurativa (acne inversa)  Acne conglobata (a severe form of nodulocystic acne)  Dissecting cellulitis (perifolliculitis capitis abscedens et suffodiens)  Pilonidal sinus.    Treatment of the acne variants may include topical therapy as well as long courses of tetracycline antibiotics, isotretinoin (vitamin-A derivative) and in women, antiandrogenic therapy.    Buttock folliculitis  Folliculitis affecting the buttocks is quite common and is often nonspecific, ie no specific cause is found. Buttock folliculitis is equally common in males and females.  Acute buttock folliculitis is usually bacterial in origin (like boils), resulting in red painful papules and pustules. It clears with antibiotics.  Chronic buttock folliculitis does not often cause significant symptoms but it can be very persistent. Although antiseptics, topical acne treatments, peeling agents such as alphahydroxy acids, long courses of oral antibiotics and isotretinoin can help buttock folliculitis, they are not always effective. Hair removal might be worth trying if the affected area is hairy. As regrowth of hair can make it worse, permanent hair reduction by laser or intense pulsed light (IPL) is best.

## 2025-01-16 NOTE — PROGRESS NOTES
"[facility] Note     Patient Name: Elda Flores  Encounter Date: 1/16/25     Have you been cared for by a [facility] Dermatologist in the last 3 years and, if so, which description applies to you?    NO.   I am considered a \"new\" patient and must complete all patient intake questions. I am FEMALE/of child-bearing potential.    REVIEW OF SYSTEMS:  Have you recently had or currently have any of the following? Recent fever or chills? No  Any non-healing wound? No  Are you pregnant or planning to become pregnant? No  Are you currently or planning to be nursing or breast feeding? No   PAST MEDICAL HISTORY:  Have you personally ever had or currently have any of the following?  If \"YES,\" then please provide more detail. Skin cancer (such as Melanoma, Basal Cell Carcinoma, Squamous Cell Carcinoma?  No  Tuberculosis, HIV/AIDS, Hepatitis B or C: No  Radiation Treatment No   HISTORY OF IMMUNOSUPPRESSION:   Do you have a history of any of the following:  Systemic Immunosuppression such as Diabetes, Biologic or Immunotherapy, Chemotherapy, Organ Transplantation, Bone Marrow Transplantation or Prednsione?  No    Answering \"YES\" requires the addition of the dotphrase \"IMMUNOSUPPRESSED\" as the first diagnosis of the patient's visit.   FAMILY HISTORY:  Any \"first degree relatives\" (parent, brother, sister, or child) with the following?    Skin Cancer, Pancreatic or Other Cancer? No   PATIENT EXPERIENCE:    Do you want the Dermatologist to perform a COMPLETE skin exam today including a clinical examination under the \"bra and underwear\" areas?  NO  If necessary, do we have your permission to call and leave a detailed message on your Preferred Phone number that includes your specific medical information?  Yes      No Known Allergies   Current Outpatient Medications:     benzocaine-menthol-lanolin-aloe (DERMOPLAST) 20-0.5 % topical spray, Apply 1 Application topically every 6 (six) hours as needed for mild pain " (Patient not taking: Reported on 1/12/2025), Disp: , Rfl:     dicloxacillin (DYNAPEN) 250 mg capsule, Take 1 capsule (250 mg total) by mouth every 6 (six) hours for 7 days, Disp: 28 capsule, Rfl: 0    Doxylamine Succinate, Sleep, (UNISOM PO), Take by mouth (Patient not taking: Reported on 1/12/2025), Disp: , Rfl:     famotidine (PEPCID) 10 mg tablet, Take 10 mg by mouth 2 (two) times a day (Patient not taking: Reported on 1/12/2025), Disp: , Rfl:     hydrocortisone 1 % cream, Apply 1 Application topically daily as needed for irritation (Patient not taking: Reported on 1/12/2025), Disp: , Rfl:     NIFEdipine (PROCARDIA XL) 30 mg 24 hr tablet, Take 1 tablet (30 mg total) by mouth daily (Patient not taking: Reported on 1/12/2025), Disp: 30 tablet, Rfl: 2    ondansetron (ZOFRAN-ODT) 4 mg disintegrating tablet, Take 1 tablet (4 mg total) by mouth every 8 (eight) hours as needed for nausea or vomiting Take one half tab to start. (Patient not taking: Reported on 1/12/2025), Disp: 30 tablet, Rfl: 1    Prenatal Vit w/Nj-Wyrnuoqpj-LZ (PNV PO), Take by mouth (Patient not taking: Reported on 1/12/2025), Disp: , Rfl:     Pyridoxine HCl (B-6 PO), Take by mouth (Patient not taking: Reported on 1/12/2025), Disp: , Rfl:     sodium chloride (OCEAN) 0.65 % nasal spray, 1 spray into each nostril every hour as needed for congestion (Patient not taking: Reported on 1/12/2025), Disp: , Rfl:     witch hazel-glycerin (TUCKS) topical pad, Apply 1 Pad topically every 4 (four) hours as needed for irritation (Patient not taking: Reported on 1/12/2025), Disp: , Rfl:           Whom besides the patient is providing clinical information about today's encounter?   NO ADDITIONAL HISTORIAN (patient alone provided history)    Physical Exam and Assessment/Plan by Diagnosis:      FOLLICULITIS    Physical Exam:  Anatomic Location Affected:  trunk, extremities, neck, face  Morphological Description:  erythematous pustules and comedones  Pertinent  Positives:  Pertinent Negatives:    Additional History of Present Condition:  Patient presents today for rash on chest and back. She reports that started 2 weeks prior to giving birth as a few pustules. After giving birth, rash got worse and spread. She notes that it is very itchy and occasionally painful. She has tried benadryl, hydrocortisone cream, using Dreft laundry detergent, patient was washing with Purifying cleanser, tried BP, oatmeal soap. She has no hx of acne before pregnancy. She is not breast feeding.      Assessment and Plan:  Based on a thorough discussion of this condition and the management approach to it (including a comprehensive discussion of the known risks, side effects and potential benefits of treatment), the patient (family) agrees to implement the following specific plan:  Wound culture taken today  Start Doxycycline 100 mg twice daily for 2 weeks; take with full glass of water and food  Apply Vaseline/ moisturizer  Two days after starting doxycyline, begin Mometasone 1 % ointment twice daily until clear   Follow up in 2 weeks    What is folliculitis?  Folliculitis is the name given to a group of skin conditions in which there are inflamed hair follicles. The result is a tender red spot, often with a surface pustule.  Folliculitis may be superficial or deep. It can affect anywhere there are hairs, including chest, back, buttocks, arms and legs. Acne and its variants are also types of folliculitis.    What causes folliculitis?  Folliculitis can be due to infection, occlusion (blockage), irritation and various skin diseases.    Folliculitis due to infection  To determine if folliculitis is due to an infection, swabs should be taken from the pustules for cytology and culture in the laboratory.    Bacteria  Bacterial folliculitis is commonly due to Staphylococcus aureus. If the infection involves the deep part of the follicle, it results in a painful boil. Recommended treatment includes  careful hygiene, antiseptic cleanser or cream, antibiotic ointment, and/or oral antibiotics.    Spa pool folliculitis is due to infection with Pseudomonas aeruginosa, which thrives in inadequately chlorinated warm water. Gram negative folliculitis is a pustular facial eruption also due to infection with Pseudomonas aeruginosa or other similar organisms. When it appears, it usually follows tetracycline treatment of acne, but is quite rare.    Yeasts  The most common yeast to cause a folliculitis is Pityrosporum ovale, also known as Malassezia. Malassezia folliculitis (Pityrosporum folliculitis) is an itchy acne-like condition usually affecting the upper trunk of a young adult. Treatment includes avoiding moisturisers, stopping any antibiotics and topical antifungal or oral antifungal medication for several weeks.    Candida albicans can also provoke a folliculitis in skin folds (intertrigo) or in the beard area. It is treated with topical or oral antifungal agents.    Fungi  Ringworm of the scalp (tinea capitis) usually results in scaling and hair loss, but sometimes results in folliculitis. In [place], cat ringworm (Microsporum canis) is the commonest organism causing scalp fungal infection. Other fungi such as Trichophyton tonsurans are increasingly reported. Treatment is with oral antifungal agents for several months.    Viral infections  Folliculitis may caused by herpes simplex virus. This tends to be tender, and resolves without treatment in around 10 days. Severe recurrent attacks may be treated with acyclovir and other antiviral agents.    Herpes zoster (the cause of shingles) may also present as folliculitis with painful pustules and crusted spots within a dermatome (an area of skin supplied by a single nerve). It is treated with hihg-dose acyclovir.  Molluscum contagiosum, common in young children, may also cause follicular umbilicated papules, usually clustered in and around a body fold. Molluscum may  provoke dermatitis.    Parasitic infection  Folliculitis on the face or scalp of older or immunosuppressed adults may be due to colonisation by hair follicle mites (demodex). This is known as demodicosis.  The human infestation, scabies, often provokes folliculitis, as well as non-follicular papules, vesicles and pustules.    Folliculitis due to irritation from regrowing hairs  Folliculitis may arise as hairs regrow after shaving, waxing, electrolysis or plucking. Swabs taken from the pustules are sterile ie there is no growth of bacteria or other organisms. In the beard area irritant folliculitis is known as pseudofolliculitis barbae.  Irritant folliculitis is also common on the lower legs of women (shaving rash). It is frequently very itchy. Treatment is by stopping hair removal, and not beginning again for about three months after the folliculitis has settled. To prevent reoccurring irritant folliculitis, use a gentle hair removal method, such as a lady's electric razor. Avoid soap and apply plenty of shaving gel, if using a blade shaver.    Folliculitis due to contact reactions  Occlusion  Paraffin-based ointments, moisturisers, and adhesive plasters may all result in a sterile folliculitis. If a moisturiser is needed, choose an oil-free product, as it is less likely to cause occlusion.    Chemicals  Coal tar, cutting oils and other chemicals may cause an irritant folliculitis. Avoid contact with the causative product.    Topical steroids  Overuse of topical steroids may produce a folliculitis. Perioral dermatitis is a facial folliculitis provoked by moisturisers and topical steroids. Perioral dermatitis is treated with tetracycline antibiotics for six weeks or so.    Folliculitis due to immunosuppression  Eosinophilic folliculitis is a specific type of folliculitis that may arise in some immune suppressed individuals such as those infected by human immunodeficiency virus (HIV) or those who have  cancer.    Folliculitis due to drugs  Folliculitis may be due to drugs, particularly corticosteroids (steroid acne), androgens (male hormones), ACTH, lithium, isoniazid (INH), phenytoin and B-complex vitamins. Protein kinase inhibitors (epidermal growth factor receptor inhibitors) and targeted therapy for metastatic melanoma (vemurafenib, dabrafenib) nearly always result in folliculitis.    Folliculitis due to inflammatory skin diseases  Certain uncommon inflammatory skin diseases may cause permanent hair loss and scarring because of deep seated sterile folliculitis. These include:  Lichen planus  Discoid lupus erythematosus  Folliculitis decalvans  Folliculitis keloidalis     Treatment depends on the underlying condition and its severity. A skin biopsy is often necessary to establish the diagnosis.    Acne variants   Acne and acne-like or acneform disorders are also forms of folliculitis. These include:  Acne vulgaris  Nodulocystic acne  Rosacea  Scalp folliculitis  Chloracne    The follicular occlusion syndrome refers to:  Hidradenitis suppurativa (acne inversa)  Acne conglobata (a severe form of nodulocystic acne)  Dissecting cellulitis (perifolliculitis capitis abscedens et suffodiens)  Pilonidal sinus.    Treatment of the acne variants may include topical therapy as well as long courses of tetracycline antibiotics, isotretinoin (vitamin-A derivative) and in women, antiandrogenic therapy.    Buttock folliculitis  Folliculitis affecting the buttocks is quite common and is often nonspecific, ie no specific cause is found. Buttock folliculitis is equally common in males and females.  Acute buttock folliculitis is usually bacterial in origin (like boils), resulting in red painful papules and pustules. It clears with antibiotics.  Chronic buttock folliculitis does not often cause significant symptoms but it can be very persistent. Although antiseptics, topical acne treatments, peeling agents such as alphahydroxy  acids, long courses of oral antibiotics and isotretinoin can help buttock folliculitis, they are not always effective. Hair removal might be worth trying if the affected area is hairy. As regrowth of hair can make it worse, permanent hair reduction by laser or intense pulsed light (IPL) is best.    Erin Merchant MD  Dermatology, PGY-2  Scribe Attestation      I,:  Gisele Portillo am acting as a scribe while in the presence of the attending physician.:       I,:  Philip Alba MD personally performed the services described in this documentation    as scribed in my presence.:

## 2025-01-19 LAB
BACTERIA WND AEROBE CULT: ABNORMAL
BACTERIA WND AEROBE CULT: ABNORMAL
GRAM STN SPEC: ABNORMAL
GRAM STN SPEC: ABNORMAL

## 2025-01-21 ENCOUNTER — RESULTS FOLLOW-UP (OUTPATIENT)
Dept: DERMATOLOGY | Facility: CLINIC | Age: 25
End: 2025-01-21

## 2025-01-30 ENCOUNTER — OFFICE VISIT (OUTPATIENT)
Dept: DERMATOLOGY | Facility: CLINIC | Age: 25
End: 2025-01-30

## 2025-01-30 VITALS — WEIGHT: 171 LBS | HEIGHT: 64 IN | TEMPERATURE: 98.1 F | BODY MASS INDEX: 29.19 KG/M2

## 2025-01-30 DIAGNOSIS — D48.9 NEOPLASM OF UNCERTAIN BEHAVIOR: ICD-10-CM

## 2025-01-30 DIAGNOSIS — L73.8 PITYROSPORUM FOLLICULITIS: Primary | ICD-10-CM

## 2025-01-30 RX ORDER — FLUCONAZOLE 200 MG/1
TABLET ORAL
Qty: 21 TABLET | Refills: 0 | Status: SHIPPED | OUTPATIENT
Start: 2025-01-30 | End: 2025-02-20

## 2025-02-13 ENCOUNTER — OFFICE VISIT (OUTPATIENT)
Dept: DERMATOLOGY | Facility: CLINIC | Age: 25
End: 2025-02-13

## 2025-02-13 VITALS — HEIGHT: 64 IN | TEMPERATURE: 97.3 F | WEIGHT: 176 LBS | BODY MASS INDEX: 30.05 KG/M2

## 2025-02-13 DIAGNOSIS — L73.9 FOLLICULITIS: Primary | ICD-10-CM

## 2025-02-13 DIAGNOSIS — L73.8 PITYROSPORUM FOLLICULITIS: ICD-10-CM

## 2025-02-13 DIAGNOSIS — D48.9 NEOPLASM OF UNCERTAIN BEHAVIOR: ICD-10-CM

## 2025-02-13 PROCEDURE — 88342 IMHCHEM/IMCYTCHM 1ST ANTB: CPT | Performed by: STUDENT IN AN ORGANIZED HEALTH CARE EDUCATION/TRAINING PROGRAM

## 2025-02-13 PROCEDURE — 87205 SMEAR GRAM STAIN: CPT

## 2025-02-13 PROCEDURE — 88305 TISSUE EXAM BY PATHOLOGIST: CPT | Performed by: STUDENT IN AN ORGANIZED HEALTH CARE EDUCATION/TRAINING PROGRAM

## 2025-02-13 PROCEDURE — 87070 CULTURE OTHR SPECIMN AEROBIC: CPT

## 2025-02-13 PROCEDURE — 88313 SPECIAL STAINS GROUP 2: CPT | Performed by: STUDENT IN AN ORGANIZED HEALTH CARE EDUCATION/TRAINING PROGRAM

## 2025-02-13 PROCEDURE — 88341 IMHCHEM/IMCYTCHM EA ADD ANTB: CPT | Performed by: STUDENT IN AN ORGANIZED HEALTH CARE EDUCATION/TRAINING PROGRAM

## 2025-02-13 PROCEDURE — 88312 SPECIAL STAINS GROUP 1: CPT | Performed by: STUDENT IN AN ORGANIZED HEALTH CARE EDUCATION/TRAINING PROGRAM

## 2025-02-13 RX ORDER — CLINDAMYCIN PHOSPHATE 10 MG/G
GEL TOPICAL 2 TIMES DAILY
Qty: 30 G | Refills: 2 | Status: SHIPPED | OUTPATIENT
Start: 2025-02-13

## 2025-02-13 NOTE — PROGRESS NOTES
"[facility] Note     Patient Name: Elda Flores  Encounter Date: 2/13/2025     Have you been cared for by a [facility] Dermatologist in the last 3 years and, if so, which description applies to you?    Yes.  I have been here within the last 3 years, and my medical history has NOT changed since that time.  I am FEMALE/of child-bearing potential.    REVIEW OF SYSTEMS:  Have you recently had or currently have any of the following? No changes in my recent health.   PAST MEDICAL HISTORY:  Have you personally ever had or currently have any of the following?  If \"YES,\" then please provide more detail. No changes in my medical history.   HISTORY OF IMMUNOSUPPRESSION: Do you have a history of any of the following:  Systemic Immunosuppression such as Diabetes, Biologic or Immunotherapy, Chemotherapy, Organ Transplantation, Bone Marrow Transplantation or Prednisone?  No     Answering \"YES\" requires the addition of the dotphrase \"IMMUNOSUPPRESSED\" as the first diagnosis of the patient's visit.   FAMILY HISTORY:  Any \"first degree relatives\" (parent, brother, sister, or child) with the following?    No changes in my family's known health.   PATIENT EXPERIENCE:    Do you want the Dermatologist to perform a COMPLETE skin exam today including a clinical examination under the \"bra and underwear\" areas?  NO  If necessary, do we have your permission to call and leave a detailed message on your Preferred Phone number that includes your specific medical information?  Yes      No Known Allergies   Current Outpatient Medications:     benzocaine-menthol-lanolin-aloe (DERMOPLAST) 20-0.5 % topical spray, Apply 1 Application topically every 6 (six) hours as needed for mild pain (Patient not taking: Reported on 12/24/2024), Disp: , Rfl:     Doxylamine Succinate, Sleep, (UNISOM PO), Take by mouth (Patient not taking: Reported on 12/24/2024), Disp: , Rfl:     famotidine (PEPCID) 10 mg tablet, Take 10 mg by mouth 2 (two) times " a day (Patient not taking: Reported on 12/24/2024), Disp: , Rfl:     fluconazole (DIFLUCAN) 200 mg tablet, Take 1 pill once a day, Disp: 21 tablet, Rfl: 0    hydrocortisone 1 % cream, Apply 1 Application topically daily as needed for irritation (Patient not taking: Reported on 12/24/2024), Disp: , Rfl:     mometasone (ELOCON) 0.1 % ointment, Apply twice daily to chest and back starting 2 days after doxycyline for 2 weeks, Disp: 45 g, Rfl: 3    NIFEdipine (PROCARDIA XL) 30 mg 24 hr tablet, Take 1 tablet (30 mg total) by mouth daily (Patient not taking: Reported on 1/30/2025), Disp: 30 tablet, Rfl: 2    ondansetron (ZOFRAN-ODT) 4 mg disintegrating tablet, Take 1 tablet (4 mg total) by mouth every 8 (eight) hours as needed for nausea or vomiting Take one half tab to start. (Patient not taking: Reported on 1/8/2025), Disp: 30 tablet, Rfl: 1    Prenatal Vit w/Ed-Vbradpewt-EN (PNV PO), Take by mouth (Patient not taking: Reported on 1/30/2025), Disp: , Rfl:     Pyridoxine HCl (B-6 PO), Take by mouth (Patient not taking: Reported on 12/24/2024), Disp: , Rfl:     sodium chloride (OCEAN) 0.65 % nasal spray, 1 spray into each nostril every hour as needed for congestion (Patient not taking: Reported on 12/24/2024), Disp: , Rfl:     witch hazel-glycerin (TUCKS) topical pad, Apply 1 Pad topically every 4 (four) hours as needed for irritation (Patient not taking: Reported on 12/24/2024), Disp: , Rfl:           Whom besides the patient is providing clinical information about today's encounter?   NO ADDITIONAL HISTORIAN (patient alone provided history)    Physical Exam and Assessment/Plan by Diagnosis:    FOLLICULITIS     Physical Exam:  Anatomic Location Affected:  trunk, extremities, neck, face  Morphological Description:  erythematous pustules and papules  Pertinent Positives: itching  Pertinent Negatives:    Additional History of Present Condition:  Patient reports that the rash is still present, she is using the mometasone 0.1  "ointment and has been taking Fluconazole 200mg. She notes that the rash isn't really changing. She notes it dried out a bit but it is still itchy. She previously completed 2 week course of doxycycline. She reports that toward the end of doxycycline course the rash was starting to show improvement, but once she finished course it stopped improving and returned to original state of rash.     Assessment and Plan: Based on appearance and symptoms, lack of improvement with doxycycline and topical steroid, I favor Pityrosporum folliculitis. Offered biopsy to confirm but patient prefers trial of therapy.     Based on a thorough discussion of this condition and the management approach to it (including a comprehensive discussion of the known risks, side effects and potential benefits of treatment), the patient (family) agrees to implement the following specific plan:  Start Clindamycin gel, apply  2 times a day  Start using an over the counter benzoyl peroxide wash on the affected areas  Wound swab of the left cheek was taken in office today, patient verbally agreed to procedure   Biopsy of one of the papules was done in office today      NEOPLASM OF UNCERTAIN BEHAVIOR OF SKIN    Physical Exam:  (Anatomic Location); (Size and Morphological Description); (Differential Diagnosis):  Mid chest; 4mm erythematous pustule; Pityrosporum Folliculitis vs Bacterial Folliculitis vs other folliculitis  Pertinent Positives:  Pertinent Negatives:    Additional History of Present Condition:  see above history    Assessment and Plan:  I have discussed with the patient that a sample of skin via a \"skin biopsy” would be potentially helpful to further make a specific diagnosis under the microscope.  Based on a thorough discussion of this condition and the management approach to it (including a comprehensive discussion of the known risks, side effects and potential benefits of treatment), the patient (family) agrees to implement the following " specific plan:        PROCEDURE NOTE:  PUNCH BIOPSY      Performing Physician:  Dr. Merchant    Anatomic Location; Clinical Description with size (cm); Pre-Op Diagnosis:      Mid chest; 4mm erythematous pustule; Pityrosporum Folliculitis vs Bacterial Folliculitis vs other folliculitis       Anesthesia: 3:1 1% xylocaine with epi and 1-100,000 buffered      Topical anesthesia: None       Indications: To indicate diagnosis and management plan.    Procedure Details     Patient informed of the risks (including bleeding,scaring and infection) and benefits of the procedure explained. Verbal and written informed consent obtained. The area was prepped and draped in the usual fashion. Anesthesia was obtained with 1% lidocaine with epinephrine. The skin was then stretched perpendicular to the skin tension lines and a punch biopsy to an appropriate sampling depth was obtained with a 4 mm punch with a forceps and iris scissors.     Hemostasis was obtained with 4-0 Prolene x 1 sutures.     Complications:  None      Specimen has been sent for review by Dermatopathology.      Plan:  1. Instructed to keep the wound dry and covered for 24-48h and clean thereafter.  2. Warning signs of infection were reviewed.    3. Recommended that the patient use acetaminophen as needed for pain  4. Sutures if any should be removed in 14 days      Standard post-procedure care has been explained and has been included in written form within the patient's copy of Informed Consent.        Erin Merchant MD  Dermatology, PGY-2  Scribe Attestation      I,:  Anne Anderson MA am acting as a scribe while in the presence of the attending physician.:       I,:  Darius Woods MD personally performed the services described in this documentation    as scribed in my presence.:

## 2025-02-15 LAB
BACTERIA WND AEROBE CULT: NORMAL
GRAM STN SPEC: NORMAL

## 2025-02-17 PROCEDURE — 88341 IMHCHEM/IMCYTCHM EA ADD ANTB: CPT | Performed by: DERMATOLOGY

## 2025-02-17 PROCEDURE — 88305 TISSUE EXAM BY PATHOLOGIST: CPT | Performed by: DERMATOLOGY

## 2025-02-17 PROCEDURE — 88342 IMHCHEM/IMCYTCHM 1ST ANTB: CPT | Performed by: DERMATOLOGY

## 2025-02-18 ENCOUNTER — RESULTS FOLLOW-UP (OUTPATIENT)
Dept: DERMATOLOGY | Facility: CLINIC | Age: 25
End: 2025-02-18

## 2025-02-18 NOTE — TELEPHONE ENCOUNTER
Patient called office to review pathology report with Dr. Merchant.     Patient is available anytime. She can be reached at [phone].

## 2025-02-18 NOTE — RESULT ENCOUNTER NOTE
Attempt #1: Attempted to reach patient with biopsy results. No answer. Left voicemail. Will continue to try.    Erin Merchant  PGY2 Dermatology Resident

## 2025-02-18 NOTE — RESULT ENCOUNTER NOTE
DERMATOPATHOLOGY RESULT NOTE    Results reviewed by ordering physician.  Called patient to personally discuss results. Discussed results with patient. All questions answered.      Instructions for Clinical Derm Team:   (remember to route Result Note to appropriate staff):    Call patient and schedule for excision: can be with either Dr. Young at [facility] or derm resident excision clinic.     Result & Plan by Specimen:    Specimen A: severely atypical nevus  Plan: excision    [id number]  Order: [id number]   Status: Final result      Dx: Neoplasm of uncertain behavior    Test Result Released: Yes (seen)    View Coordination for this Result      View Follow-Up Encounter     Component  Ref Range & Units (hover)   Case Report  Surgical Pathology Report                         Case: [id number]                                  Authorizing Provider:  Erin Merchant MD        Collected:           01/30/2025 1039              Ordering Location:     [facility]      Received:            01/30/2025 1039                                     [place]                                                                      Pathologist:           Yuli Young MD                                                      Specimen:    Skin, Other, Specimen A: central back                                                    Final Diagnosis  A. Skin, central back, shave biopsy:  Compound dysplastic nevus with severe atypia; extending to biopsy margins. (See comment)    Comment: Deeper levels, SOX10, MelanA, p16, PRAME, and HMB45 were reviewed. PRAME is interpreted as negative (<5% of melanocytes positive). P16 expression is retained. An early evolving melanoma was considered, but the overall features and staining pattern is most supportive of a dysplastic nevus with severe atypia.    Representative sections have been reviewed by one or more pathologists as an intradepartmental consultation.     Re-excision of the lesion is  "recommended.      Electronically signed by Yuli Young MD on 2/17/2025 at 1115 EST      Click \"FILE TO CHART\" button so that a copy of this Dermatopathology Result Note will be automatically placed in the patient's chart.  "

## 2025-02-21 PROCEDURE — 88341 IMHCHEM/IMCYTCHM EA ADD ANTB: CPT | Performed by: STUDENT IN AN ORGANIZED HEALTH CARE EDUCATION/TRAINING PROGRAM

## 2025-02-21 PROCEDURE — 88342 IMHCHEM/IMCYTCHM 1ST ANTB: CPT | Performed by: STUDENT IN AN ORGANIZED HEALTH CARE EDUCATION/TRAINING PROGRAM

## 2025-02-21 PROCEDURE — 88305 TISSUE EXAM BY PATHOLOGIST: CPT | Performed by: STUDENT IN AN ORGANIZED HEALTH CARE EDUCATION/TRAINING PROGRAM

## 2025-02-21 PROCEDURE — 88312 SPECIAL STAINS GROUP 1: CPT | Performed by: STUDENT IN AN ORGANIZED HEALTH CARE EDUCATION/TRAINING PROGRAM

## 2025-02-21 PROCEDURE — 88313 SPECIAL STAINS GROUP 2: CPT | Performed by: STUDENT IN AN ORGANIZED HEALTH CARE EDUCATION/TRAINING PROGRAM

## 2025-02-25 ENCOUNTER — RESULTS FOLLOW-UP (OUTPATIENT)
Dept: DERMATOLOGY | Facility: CLINIC | Age: 25
End: 2025-02-25

## 2025-02-25 DIAGNOSIS — L73.9 FOLLICULITIS: Primary | ICD-10-CM

## 2025-02-25 RX ORDER — DOXYCYCLINE 100 MG/1
100 CAPSULE ORAL 2 TIMES DAILY
Qty: 60 CAPSULE | Refills: 1 | Status: SHIPPED | OUTPATIENT
Start: 2025-02-25 | End: 2025-04-26

## 2025-02-25 NOTE — RESULT ENCOUNTER NOTE
DERMATOPATHOLOGY RESULT NOTE    Results reviewed by ordering physician.  Called patient to personally discuss results. No answer, left voicemail with result. Instructed patient that prescription for doxycycline would be sent to her pharmacy for 2 month course. Advised that it should be taken twice daily with food and full glass of water and to use sun protection.      Instructions for Clinical Derm Team:   (remember to route Result Note to appropriate staff):    Call patient and schedule for follow up in 8-10 weeks    Result & Plan by Specimen:    Specimen A: bacterial folliculitis  Plan: prescription for doxycycline 100mg BID for 2 months. Follow up in 8-10 weeks.    [id number]  Order: [id number]   Status: Final result      Dx: Neoplasm of uncertain behavior    Test Result Released: Yes (not seen)    View Coordination for this Result      View Follow-Up Encounter     Component  Ref Range & Units (hover)   Case Report  Surgical Pathology Report                         Case: [id number]                                  Authorizing Provider:  Erin Merchant MD        Collected:           02/13/2025 1002              Ordering Location:     [facility]      Received:            02/13/2025 [address] [place] [address]                                                                      Pathologist:           Toby Valentin MD                                                          Specimen:    Skin, Other, A: mid chest                                                                Final Diagnosis  A. Skin, mid chest, punch biopsy:    Superficial to deep periadnexal/perivascular/interstitial neutrophil-rich mixed inflammatory infiltrate and abscess containing Gram-positive bacterial cocci (see note).    Note: In the appropriate clinical context, the histopathologic findings are consistent with bacteria-associated folliculitis/acneiform eruption. Clinical pathologic correlation is advised.  PAS, GMS, and Gram stains were reviewed (AFB stain is pending). CD3, CD20, CD30, , and CD68 immunostains were reviewed; findings diagnostic of hematologic malignancy or a lymphoproliferative disorder are not appreciated. Significant lymphocytic cytologic atypia is not seen. Spirochete immunostain is pending. Multiple levels examined. If the rash were to progress/persist despite therapy, additional sampling should be sought to exclude development of a more significant disease.          Comments:  This is an appended report. These results have been appended to a previously preliminary verified report.    Electronically signed by Toby Valentin MD on 2/21/2025 at 1025 EST

## 2025-03-27 ENCOUNTER — TELEPHONE (OUTPATIENT)
Dept: DERMATOLOGY | Facility: CLINIC | Age: 25
End: 2025-03-27

## 2025-03-27 NOTE — TELEPHONE ENCOUNTER
Per Referral #[id number] from [facility] Team on 3/4, the upcoming procedure for 4/2 was denied.  After checking with [facility] team and being advised they send their messages to the clinical in basket....       INBASKET SENT TO DERM OFFICE ADVISING EXCISION WILL NOT BE COVERED     There are no notes indicating the Pt was called and advised and the appt is still on the OSIEL... Can someone from clinical please reach out to this Pt and advise if appt should be cancelled or if Pt is doing self pay?  Also, the Pt already has an Outstanding Bad Debt Balance of $[phone] with [facility]

## 2025-03-27 NOTE — PROGRESS NOTES
Name: Elda Flores      : 2000      MRN: [id number]  Encounter Provider: Nazia Winters PA-C  Encounter Date: 3/31/2025   Encounter department: [facility]  :  Assessment & Plan  Women's annual routine gynecological examination    Exercise most days of week-minimum of 150-300 minutes per week.   Obtain appropriate diet and hydration.   Calcium 1000mg (in divided doses-max 600 mg at one time) + 600 vit D daily.  Birth control declined  Condom use when sexually active for sexually transmitted infection prevention.     monthly breast self exam recommended, reviewed the s/s of breast cancer and advised her to call if she experiences them  Kegels 20 times twice daily.   Call your insurance company to verify coverage prior to completing any ordered tests.   Return to office in one year or sooner, if needed.      Postpartum care following vaginal delivery  Pelvic floor PT s/p vaginal delivery 24  Orders:    Ambulatory Referral to Physical Therapy; Future        History of Present Illness   HPI  Elda Flores is a 24 y.o. female who presents annual examination      LMP 3/14/25 has been occurring monthly, this is her third menses since delivery  Sexually active Yes Monogamous   Birth control No, patient is not interested at this time  Desiring pregnancy in the next year No  History of abnormal pap: No  Last pap 2024 , Findings NILM , Next pap:   Self breast exam Yes  HPV vaccine series completed: No    EPDS 6  Substance abuse: 0  Patient is bottlefeeding without complications    On doxycycline due to folliculitis which the patient states was associated with her hospital stay    Hereditary Cancer Screening  FH Breast/Ovarian cancer: denies  FH Uterine cancer: denies  FH Colon cancer: Maternal GF  Cancer-related family history includes Colon cancer in her maternal grandfather; Skin cancer in her maternal grandfather. There is no history of Breast  cancer, Ovarian cancer, or Uterine cancer.    Review of Systems   Constitutional:  Negative for chills, fatigue and fever.   Eyes:  Negative for visual disturbance.   Respiratory:  Negative for shortness of breath.    Cardiovascular:  Negative for chest pain and palpitations.   Gastrointestinal:  Negative for abdominal pain, anal bleeding, blood in stool, constipation, diarrhea, nausea and vomiting.   Genitourinary:  Negative for difficulty urinating, dyspareunia, dysuria, frequency, hematuria, menstrual problem, pelvic pain, urgency, vaginal bleeding, vaginal discharge and vaginal pain.   Skin:  Positive for rash (folliculitis).   Neurological:  Negative for dizziness, syncope, light-headedness and headaches.   Psychiatric/Behavioral:  Negative for dysphoric mood. The patient is not nervous/anxious.      Current Outpatient Medications on File Prior to Visit   Medication Sig Dispense Refill    doxycycline hyclate (VIBRAMYCIN) 100 mg capsule Take 1 capsule (100 mg total) by mouth 2 (two) times a day Take with food and full glass of water. Remain upright for 30 minutes after taking. Use sun protection when spending time outdoors. 60 capsule 1    ondansetron (ZOFRAN-ODT) 4 mg disintegrating tablet Take 1 tablet (4 mg total) by mouth every 8 (eight) hours as needed for nausea or vomiting Take one half tab to start. (Patient not taking: Reported on 1/8/2025) 30 tablet 1    [DISCONTINUED] benzocaine-menthol-lanolin-aloe (DERMOPLAST) 20-0.5 % topical spray Apply 1 Application topically every 6 (six) hours as needed for mild pain (Patient not taking: Reported on 2/13/2025)      [DISCONTINUED] clindamycin 1 % gel Apply topically 2 (two) times a day (Patient not taking: Reported on 3/31/2025) 30 g 2    [DISCONTINUED] Doxylamine Succinate, Sleep, (UNISOM PO) Take by mouth (Patient not taking: Reported on 2/13/2025)      [DISCONTINUED] famotidine (PEPCID) 10 mg tablet Take 10 mg by mouth 2 (two) times a day (Patient not taking:  Reported on 2/13/2025)      [DISCONTINUED] hydrocortisone 1 % cream Apply 1 Application topically daily as needed for irritation (Patient not taking: Reported on 2/13/2025)      [DISCONTINUED] mometasone (ELOCON) 0.1 % ointment Apply twice daily to chest and back starting 2 days after doxycyline for 2 weeks (Patient not taking: Reported on 3/31/2025) 45 g 3    [DISCONTINUED] NIFEdipine (PROCARDIA XL) 30 mg 24 hr tablet Take 1 tablet (30 mg total) by mouth daily (Patient not taking: Reported on 1/12/2025) 30 tablet 2    [DISCONTINUED] Prenatal Vit w/Uv-Uchomdmtd-XO (PNV PO) Take by mouth (Patient not taking: Reported on 1/12/2025)      [DISCONTINUED] Pyridoxine HCl (B-6 PO) Take by mouth (Patient not taking: Reported on 2/13/2025)      [DISCONTINUED] sodium chloride (OCEAN) 0.65 % nasal spray 1 spray into each nostril every hour as needed for congestion (Patient not taking: Reported on 2/13/2025)      [DISCONTINUED] witch hazel-glycerin (TUCKS) topical pad Apply 1 Pad topically every 4 (four) hours as needed for irritation (Patient not taking: Reported on 2/13/2025)       No current facility-administered medications on file prior to visit.      Social History     Tobacco Use    Smoking status: Never    Smokeless tobacco: Never   Vaping Use    Vaping status: Never Used   Substance and Sexual Activity    Alcohol use: Not Currently     Comment: socially    Drug use: Never    Sexual activity: Yes     Partners: Male     Birth control/protection: None        Objective   /82 (BP Location: Left arm, Patient Position: Sitting, Cuff Size: Standard)      Physical Exam  Vitals and nursing note reviewed.   Constitutional:       General: She is not in acute distress.     Appearance: She is well-developed.   HENT:      Head: Normocephalic and atraumatic.   Eyes:      Extraocular Movements: Extraocular movements intact.   Pulmonary:      Effort: Pulmonary effort is normal.   Chest:   Breasts:     Right: No swelling, bleeding,  inverted nipple, mass, nipple discharge, skin change or tenderness.      Left: No swelling, bleeding, inverted nipple, mass, nipple discharge, skin change or tenderness.   Abdominal:      Palpations: Abdomen is soft.      Tenderness: There is no abdominal tenderness.      Hernia: There is no hernia in the left inguinal area or right inguinal area.   Genitourinary:     General: Normal vulva.      Exam position: Lithotomy position.      Pubic Area: No rash.       Labia:         Right: No rash, tenderness or lesion.         Left: No rash, tenderness or lesion.       Urethra: No urethral pain or urethral swelling.      Vagina: No vaginal discharge, erythema, tenderness, bleeding or lesions.      Cervix: No cervical motion tenderness, discharge, friability, lesion, erythema or cervical bleeding.      Uterus: Not enlarged and not tender.       Adnexa:         Right: No mass, tenderness or fullness.          Left: No mass, tenderness or fullness.     Lymphadenopathy:      Upper Body:      Right upper body: No supraclavicular, axillary or pectoral adenopathy.      Left upper body: No supraclavicular, axillary or pectoral adenopathy.      Lower Body: No right inguinal adenopathy. No left inguinal adenopathy.   Skin:     General: Skin is warm and dry.   Neurological:      Mental Status: She is alert.   Psychiatric:         Mood and Affect: Mood normal.         Behavior: Behavior normal.         Nazia Winters PA-C

## 2025-03-27 NOTE — TELEPHONE ENCOUNTER
I tried reaching out to the patient but could not get through. Left a vm informing her that her procedure was denied by her medical insurance. Following our conversation on 3/4/25, the patient mentioned that she would contact billing to explore payment options. It is uncertain whether the patient has made this contact. In the meantime, I have contacted Dr. Young and Dr. Merchant to discuss the possibility of referring the patient to [facility] or to explore new alterative treatment options.

## 2025-03-27 NOTE — TELEPHONE ENCOUNTER
Dr. Young, The patient is scheduled for an excision on her central back; with pathology results indicating a compound dysplastic nevus with severe atypia, but unfortunately, her insurance does not cover it. Would you be willing to consider alternative options, such as a punch biopsy instead of an excision, to determine if insurance coverage is possible? Also, would you be open to referring her to [facility], where the procedure could be performed by a resident? I look forward to your guidance. Thank you.

## 2025-03-28 NOTE — TELEPHONE ENCOUNTER
"Dr. Young: Unfortunately, her insurance plan is a \"limited\" plan and the procedure is not a covered service within her plan so an appeal/[id number] would not be an option in this case. "

## 2025-03-31 ENCOUNTER — ANNUAL EXAM (OUTPATIENT)
Dept: OBGYN CLINIC | Facility: CLINIC | Age: 25
End: 2025-03-31
Payer: COMMERCIAL

## 2025-03-31 VITALS — DIASTOLIC BLOOD PRESSURE: 82 MMHG | SYSTOLIC BLOOD PRESSURE: 126 MMHG

## 2025-03-31 DIAGNOSIS — Z01.419 WOMEN'S ANNUAL ROUTINE GYNECOLOGICAL EXAMINATION: Primary | ICD-10-CM

## 2025-03-31 PROCEDURE — 99395 PREV VISIT EST AGE 18-39: CPT

## 2025-04-03 PROCEDURE — 88305 TISSUE EXAM BY PATHOLOGIST: CPT | Performed by: DERMATOLOGY

## 2025-04-03 PROCEDURE — 88341 IMHCHEM/IMCYTCHM EA ADD ANTB: CPT | Performed by: DERMATOLOGY

## 2025-04-03 PROCEDURE — 88342 IMHCHEM/IMCYTCHM 1ST ANTB: CPT | Performed by: DERMATOLOGY

## 2025-04-14 PROCEDURE — 88341 IMHCHEM/IMCYTCHM EA ADD ANTB: CPT | Performed by: DERMATOLOGY

## 2025-04-14 PROCEDURE — 88305 TISSUE EXAM BY PATHOLOGIST: CPT | Performed by: DERMATOLOGY

## 2025-04-14 PROCEDURE — 88342 IMHCHEM/IMCYTCHM 1ST ANTB: CPT | Performed by: DERMATOLOGY

## 2025-04-15 ENCOUNTER — RESULTS FOLLOW-UP (OUTPATIENT)
Age: 25
End: 2025-04-15

## 2025-04-16 NOTE — RESULT ENCOUNTER NOTE
DERMATOPATHOLOGY RESULT NOTE    Results reviewed by ordering physician.  Called patient to personally discuss results. Discussed results with patient. Explained that no residual atypical mole was seen however the scar from the original biopsy extends to the margins of the biopsy. Explained that this could mean she may have a recurrence although unlikely. Advised her to monitor the site closely for any recurrence of a bump or nodule or especially pigment in the area and alert us if this happens. Would recommend FBSE in 1 year.      Instructions for Clinical Derm Team:   (remember to route Result Note to appropriate staff):    Call patient and schedule for 1 year FBSE    Result & Plan by Specimen:    Specimen A: re-excision of dysplastic nevus  Plan: monitor and discussed possibility of recurrence    Tissue Exam: [id number]  Order: [id number]   Status: Final result      Dx: Dysplastic nevus    Test Result Released: Yes (seen)    View Coordination for this Result      View Follow-Up Encounter     Component  Ref Range & Units (hover)   Case Report  Surgical Pathology Report                         Case: [id number]                                  Authorizing Provider:  Liana Aldana MD            Collected:           04/03/2025 1529              Ordering Location:     [facility]      Received:            04/03/2025 [address] [place] [address]                                                                      Pathologist:           Yuli Young MD                                                      Specimen:    Skin, Other, Specimen A: Central back                                                    Final Diagnosis  A. Skin, Central back, Shave biopsy:  No residual dysplastic nevus identified.  Prior procedure site changes/dermal scar; scar extends to peripheral biopsy margins.    Comment: SOX10 and MelanA were reviewed.  Electronically signed by Yuli Young MD on 4/14/2025  at 1124 EDT

## 2025-06-16 ENCOUNTER — TELEPHONE (OUTPATIENT)
Dept: FAMILY MEDICINE CLINIC | Facility: CLINIC | Age: 25
End: 2025-06-16

## 2025-06-16 NOTE — TELEPHONE ENCOUNTER
Tried to call pt to remind to schedule their PHY due to being overdue. Pt did not answer left a VM to call office back to schedule.